# Patient Record
Sex: FEMALE | Race: WHITE | NOT HISPANIC OR LATINO | Employment: UNEMPLOYED | URBAN - METROPOLITAN AREA
[De-identification: names, ages, dates, MRNs, and addresses within clinical notes are randomized per-mention and may not be internally consistent; named-entity substitution may affect disease eponyms.]

---

## 2017-07-05 ENCOUNTER — HOSPITAL ENCOUNTER (OUTPATIENT)
Facility: HOSPITAL | Age: 57
Discharge: HOME | End: 2017-07-07
Attending: SURGERY | Admitting: FAMILY MEDICINE
Payer: COMMERCIAL

## 2017-07-05 DIAGNOSIS — R42 POSTURAL DIZZINESS WITH PRESYNCOPE: ICD-10-CM

## 2017-07-05 DIAGNOSIS — R42 DIZZINESS: ICD-10-CM

## 2017-07-05 DIAGNOSIS — H53.8 BLURRED VISION: Primary | ICD-10-CM

## 2017-07-05 DIAGNOSIS — K14.8: ICD-10-CM

## 2017-07-05 DIAGNOSIS — G45.9 TRANSIENT CEREBRAL ISCHEMIC ATTACK: ICD-10-CM

## 2017-07-05 DIAGNOSIS — R55 POSTURAL DIZZINESS WITH PRESYNCOPE: ICD-10-CM

## 2017-07-05 PROBLEM — F17.200 SMOKER: Status: ACTIVE | Noted: 2017-07-05

## 2017-07-05 PROBLEM — R20.0 NUMBNESS OF TONGUE: Status: ACTIVE | Noted: 2017-07-05

## 2017-07-05 LAB
ALBUMIN SERPL BCP-MCNC: 3.6 G/DL
ALP SERPL-CCNC: 119 U/L
ALT SERPL W/O P-5'-P-CCNC: 21 U/L
ANION GAP SERPL CALC-SCNC: 8 MMOL/L
AST SERPL-CCNC: 18 U/L
BACTERIA #/AREA URNS HPF: NORMAL /HPF
BASOPHILS # BLD AUTO: 0.03 K/UL
BASOPHILS NFR BLD: 0.4 %
BILIRUB SERPL-MCNC: 0.5 MG/DL
BILIRUB UR QL STRIP: NEGATIVE
BNP SERPL-MCNC: 31 PG/ML
BUN SERPL-MCNC: 17 MG/DL
CALCIUM SERPL-MCNC: 9.3 MG/DL
CHLORIDE SERPL-SCNC: 105 MMOL/L
CK MB SERPL-MCNC: 1.2 NG/ML
CK MB SERPL-RTO: 1.4 %
CK SERPL-CCNC: 83 U/L
CK SERPL-CCNC: 83 U/L
CLARITY UR: CLEAR
CO2 SERPL-SCNC: 27 MMOL/L
COLOR UR: YELLOW
CREAT SERPL-MCNC: 0.8 MG/DL
DIFFERENTIAL METHOD: NORMAL
EOSINOPHIL # BLD AUTO: 0.2 K/UL
EOSINOPHIL NFR BLD: 3 %
ERYTHROCYTE [DISTWIDTH] IN BLOOD BY AUTOMATED COUNT: 13.8 %
EST. GFR  (AFRICAN AMERICAN): >60 ML/MIN/1.73 M^2
EST. GFR  (NON AFRICAN AMERICAN): >60 ML/MIN/1.73 M^2
GLUCOSE SERPL-MCNC: 176 MG/DL
GLUCOSE UR QL STRIP: NEGATIVE
HCT VFR BLD AUTO: 44.6 %
HGB BLD-MCNC: 14.6 G/DL
HGB UR QL STRIP: ABNORMAL
INR PPP: 1
KETONES UR QL STRIP: NEGATIVE
LEUKOCYTE ESTERASE UR QL STRIP: NEGATIVE
LYMPHOCYTES # BLD AUTO: 2.7 K/UL
LYMPHOCYTES NFR BLD: 32.7 %
MAGNESIUM SERPL-MCNC: 1.8 MG/DL
MCH RBC QN AUTO: 29.5 PG
MCHC RBC AUTO-ENTMCNC: 32.7 %
MCV RBC AUTO: 90 FL
MICROSCOPIC COMMENT: NORMAL
MONOCYTES # BLD AUTO: 0.6 K/UL
MONOCYTES NFR BLD: 7.6 %
NEUTROPHILS # BLD AUTO: 4.6 K/UL
NEUTROPHILS NFR BLD: 56.3 %
NITRITE UR QL STRIP: NEGATIVE
PH UR STRIP: 5 [PH] (ref 5–8)
PHOSPHATE SERPL-MCNC: 3.3 MG/DL
PLATELET # BLD AUTO: 236 K/UL
PMV BLD AUTO: 10 FL
POTASSIUM SERPL-SCNC: 4.4 MMOL/L
PROT SERPL-MCNC: 6.9 G/DL
PROT UR QL STRIP: NEGATIVE
PROTHROMBIN TIME: 10.1 SEC
RBC # BLD AUTO: 4.95 M/UL
RBC #/AREA URNS HPF: 4 /HPF (ref 0–4)
SODIUM SERPL-SCNC: 140 MMOL/L
SP GR UR STRIP: 1.02 (ref 1–1.03)
TROPONIN I SERPL DL<=0.01 NG/ML-MCNC: <0.006 NG/ML
TROPONIN I SERPL DL<=0.01 NG/ML-MCNC: <0.006 NG/ML
TSH SERPL DL<=0.005 MIU/L-ACNC: 0.78 UIU/ML
URN SPEC COLLECT METH UR: ABNORMAL
UROBILINOGEN UR STRIP-ACNC: NEGATIVE EU/DL
WBC # BLD AUTO: 8.13 K/UL

## 2017-07-05 PROCEDURE — 83880 ASSAY OF NATRIURETIC PEPTIDE: CPT

## 2017-07-05 PROCEDURE — 25000003 PHARM REV CODE 250: Performed by: SURGERY

## 2017-07-05 PROCEDURE — 99285 EMERGENCY DEPT VISIT HI MDM: CPT

## 2017-07-05 PROCEDURE — 27000339 *HC DAILY SUPPLY KIT

## 2017-07-05 PROCEDURE — 96374 THER/PROPH/DIAG INJ IV PUSH: CPT

## 2017-07-05 PROCEDURE — 84443 ASSAY THYROID STIM HORMONE: CPT

## 2017-07-05 PROCEDURE — 25000003 PHARM REV CODE 250: Performed by: FAMILY MEDICINE

## 2017-07-05 PROCEDURE — 93005 ELECTROCARDIOGRAM TRACING: CPT

## 2017-07-05 PROCEDURE — 83735 ASSAY OF MAGNESIUM: CPT

## 2017-07-05 PROCEDURE — 36415 COLL VENOUS BLD VENIPUNCTURE: CPT

## 2017-07-05 PROCEDURE — 80053 COMPREHEN METABOLIC PANEL: CPT

## 2017-07-05 PROCEDURE — 83036 HEMOGLOBIN GLYCOSYLATED A1C: CPT

## 2017-07-05 PROCEDURE — G0378 HOSPITAL OBSERVATION PER HR: HCPCS

## 2017-07-05 PROCEDURE — 85025 COMPLETE CBC W/AUTO DIFF WBC: CPT

## 2017-07-05 PROCEDURE — 81000 URINALYSIS NONAUTO W/SCOPE: CPT

## 2017-07-05 PROCEDURE — 63600175 PHARM REV CODE 636 W HCPCS: Performed by: FAMILY MEDICINE

## 2017-07-05 PROCEDURE — 99220 PR INITIAL OBSERVATION CARE,LEVL III: CPT | Mod: ,,, | Performed by: FAMILY MEDICINE

## 2017-07-05 PROCEDURE — 82553 CREATINE MB FRACTION: CPT

## 2017-07-05 PROCEDURE — A9585 GADOBUTROL INJECTION: HCPCS | Performed by: FAMILY MEDICINE

## 2017-07-05 PROCEDURE — 94761 N-INVAS EAR/PLS OXIMETRY MLT: CPT

## 2017-07-05 PROCEDURE — 84100 ASSAY OF PHOSPHORUS: CPT

## 2017-07-05 PROCEDURE — 25500020 PHARM REV CODE 255: Performed by: FAMILY MEDICINE

## 2017-07-05 PROCEDURE — 85610 PROTHROMBIN TIME: CPT

## 2017-07-05 PROCEDURE — 84484 ASSAY OF TROPONIN QUANT: CPT

## 2017-07-05 PROCEDURE — 84484 ASSAY OF TROPONIN QUANT: CPT | Mod: 91

## 2017-07-05 RX ORDER — ASPIRIN 81 MG/1
81 TABLET ORAL DAILY
Status: DISCONTINUED | OUTPATIENT
Start: 2017-07-05 | End: 2017-07-07 | Stop reason: HOSPADM

## 2017-07-05 RX ORDER — HYDROCODONE BITARTRATE AND ACETAMINOPHEN 5; 325 MG/1; MG/1
1 TABLET ORAL EVERY 4 HOURS PRN
Status: DISCONTINUED | OUTPATIENT
Start: 2017-07-05 | End: 2017-07-07 | Stop reason: HOSPADM

## 2017-07-05 RX ORDER — ONDANSETRON 2 MG/ML
4 INJECTION INTRAMUSCULAR; INTRAVENOUS EVERY 8 HOURS PRN
Status: DISCONTINUED | OUTPATIENT
Start: 2017-07-05 | End: 2017-07-07 | Stop reason: HOSPADM

## 2017-07-05 RX ORDER — PANTOPRAZOLE SODIUM 40 MG/1
40 TABLET, DELAYED RELEASE ORAL DAILY
Status: DISCONTINUED | OUTPATIENT
Start: 2017-07-05 | End: 2017-07-07 | Stop reason: HOSPADM

## 2017-07-05 RX ORDER — LORAZEPAM 2 MG/ML
1 INJECTION INTRAMUSCULAR ONCE
Status: COMPLETED | OUTPATIENT
Start: 2017-07-05 | End: 2017-07-05

## 2017-07-05 RX ORDER — IBUPROFEN 200 MG
1 TABLET ORAL DAILY
Status: DISCONTINUED | OUTPATIENT
Start: 2017-07-05 | End: 2017-07-07 | Stop reason: HOSPADM

## 2017-07-05 RX ORDER — ACETAMINOPHEN 325 MG/1
650 TABLET ORAL EVERY 8 HOURS PRN
Status: DISCONTINUED | OUTPATIENT
Start: 2017-07-05 | End: 2017-07-07 | Stop reason: HOSPADM

## 2017-07-05 RX ORDER — GADOBUTROL 604.72 MG/ML
6 INJECTION INTRAVENOUS
Status: COMPLETED | OUTPATIENT
Start: 2017-07-05 | End: 2017-07-05

## 2017-07-05 RX ADMIN — LORAZEPAM 1 MG: 2 INJECTION, SOLUTION INTRAMUSCULAR; INTRAVENOUS at 03:07

## 2017-07-05 RX ADMIN — PANTOPRAZOLE SODIUM 40 MG: 40 TABLET, DELAYED RELEASE ORAL at 02:07

## 2017-07-05 RX ADMIN — NICOTINE 1 PATCH: 21 PATCH, EXTENDED RELEASE TRANSDERMAL at 07:07

## 2017-07-05 RX ADMIN — ASPIRIN 81 MG: 81 TABLET, COATED ORAL at 12:07

## 2017-07-05 RX ADMIN — GADOBUTROL 6 ML: 604.72 INJECTION INTRAVENOUS at 03:07

## 2017-07-05 NOTE — PROGRESS NOTES
"Nursing Notes  Bedside report received. Pt stable, and to room 303. Pt transferred from wheelchair to bed independently. Pt complains of "tip of tongue numbness." Pt is AAO x4. No acute distress noted. Plan of care reviewed with pt, and pt states agreement.     Huddle Comments    "

## 2017-07-05 NOTE — PROGRESS NOTES
Went down to MRI. Ativan given. I stayed for a few minutes to make sure pt is ok. Pt seems to be doing good so far.

## 2017-07-05 NOTE — PROGRESS NOTES
MRI tech called and states pt is refusing the MRI test because she is Claustrophobic. MD called and states to give 1mg Ativan IVP for one dose. Will intervene.

## 2017-07-05 NOTE — ED TRIAGE NOTES
"Patient was in her air conditioned car, got out to help her mother and became dizzy and felt like her tongue was getting "thick".  States she is still dizzy.  No  nystagmus noted.   "

## 2017-07-05 NOTE — ED PROVIDER NOTES
"[facility]                                        July 5, 2017                   Chief Complaint  56 y.o. female with Dizziness (dizzy and felt like her tongue was getthing "thick")    History of Present Illness  Nikky Mo presents to the emergency room with dizziness and blurred vision today  She had acute dizziness and blurred vision at 10:30 this morning, 45 minutes PTA  Additionally, the patient states that her typical her tongue felt heavy, no dysarthria  Patient states all these symptoms are new, she's never had dizziness or blurred vision  Patient presents with no gross motor defect, no facial droop, normal motor strength  Patient has no stroke history, has no history of cardiovascular disease per interview    patient however has a poor diet and is in every day heavy smoker for several years    The history is provided by the patient  Medical history: Ganglion cyst of the knee and kidney stones  Surgical history: Hysterectomy  No known ALLERGIES  Social history: , smoker, not employed  No pertinent family history    Review of Systems and Physical Exam     Review of Systems  -- Constitution - no fever, denies fatigue, no weakness, no chills  -- Eyes - no tearing or redness, no visual disturbance  -- Ear, Nose - no tinnitus or earache, no nasal congestion or discharge  -- Mouth,Throat - no sore throat, no toothache, normal voice, normal swallowing  -- Respiratory - denies cough and congestion, no shortness of breath, no CASTRO  -- Cardiovascular - denies chest pain, no palpitations, denies claudication  -- Gastrointestinal - denies abdominal pain, nausea, vomiting, or diarrhea  -- Genitourinary - no dysuria, no denies flank pain, no hematuria or frequency   -- Musculoskeletal - denies back pain, negative for myalgias and arthralgias   -- Neurological - blurred vision, tongue heaviness, dizziness ×45 minutes  -- Skin - denies pallor, rash, or changes in skin. no hives or welts " noted    Vital Signs  -- Her blood pressure is 140/93 (abnormal) and her pulse is 78.      Physical Exam  -- Nursing note and vitals reviewed  -- Constitutional: Appears well-developed and well-nourished  -- Head: Atraumatic. Normocephalic. No obvious abnormality  -- Eyes: Pupils are equal and reactive to light. Normal conjunctiva and lids  -- Cardiac: Normal rate, regular rhythm and normal heart sounds  -- Pulmonary: Normal respiratory effort, breath sounds clear to auscultation  -- Abdominal: Soft, no tenderness. Normal bowel sounds. Normal liver edge  -- Musculoskeletal: Normal range of motion, no effusions. Joints stable   -- Neurological: No focal deficits. Showed good interaction with staff  -- Vascular: Posterior tibial, dorsalis pedis and radial pulses 2+ bilaterally      Emergency Room Course     Treatment and Evaluation  -- The CT of the head performed in the ER today was negative for acute pathology   -- The EKG findings today were without concerning findings from baseline   -- The electrolytes drawn in the ER today were within normal limits   -- The CBC drawn in the ER today was within normal limits   -- The magnesium and phosphorus were within normal limits   -- TSH drawn in the ER today was negative    -- The troponin drawn in the ER today was within normal limits   -- The BNP drawn in the ER today were within normal limits   -- The PT, PTT, and INR were within normal limits.    -- PO 81 MG ASA given in today in the ER    ED Physician Notes  -- 2:04 PM: The patient's symptoms have not completely resolved today in the ER  -- CT head negative, still has tingling of the tongue, blurred vision has much improved  -- Will admit the patient for evaluation for TIA versus CVA: Discussed with Dr. Almanza    Diagnosis  -- Blurred vision  -- Dizziness  -- Tongue spasticity     Disposition and Plan  -- Disposition: observation  -- Condition: stable  -- Telemetry monitoring  -- Morning labs  -- SCD hoses  -- Home  medications  -- Nausea medication when necessary  -- Pain medication when necessary  -- Hep-Lock IV  -- Routine monitoring  -- Bed rest until otherwise stated  -- Low salt diet  -- Protonix for GERD prophylaxis  -- EKG in the morning  -- Aspirin daily  -- Neurology consult  -- Neuro checks  -- US Carotid  -- MRI Brain  -- 2D echo    This note is dictated on Dragon Natural Speaking word recognition program.  There are word recognition mistakes that are occasionally missed on review.           Shiva Gaines MD  07/05/17 2242

## 2017-07-05 NOTE — PLAN OF CARE
Problem: Patient Care Overview  Goal: Plan of Care Review  Outcome: Ongoing (interventions implemented as appropriate)  Vitals stable, afebrile, no signs of distress noted. Pt denies any pain or SOB. Pt states she still has slight numbness/tingling on the tip of her tongue, but denies any blurred vision or numbness/tingling in extremities. Cardiac monitoring maintained. SCD's maintained. Fall precautions,call bell in reach and pt instructed to call for assistance. Pt agrees with plan of care. No questions at this time.

## 2017-07-06 LAB
AORTIC VALVE REGURGITATION: NORMAL
CHOLEST/HDLC SERPL: 5.1 {RATIO}
DIASTOLIC DYSFUNCTION: NO
HDL/CHOLESTEROL RATIO: 19.4 %
HDLC SERPL-MCNC: 211 MG/DL
HDLC SERPL-MCNC: 41 MG/DL
LDLC SERPL CALC-MCNC: 141.4 MG/DL
MITRAL VALVE MOBILITY: NORMAL
NONHDLC SERPL-MCNC: 170 MG/DL
RETIRED EF AND QEF - SEE NOTES: 65 (ref 55–65)
TRICUSPID VALVE REGURGITATION: NORMAL
TRIGL SERPL-MCNC: 143 MG/DL
TROPONIN I SERPL DL<=0.01 NG/ML-MCNC: <0.006 NG/ML

## 2017-07-06 PROCEDURE — 36415 COLL VENOUS BLD VENIPUNCTURE: CPT

## 2017-07-06 PROCEDURE — 80061 LIPID PANEL: CPT

## 2017-07-06 PROCEDURE — 93306 TTE W/DOPPLER COMPLETE: CPT | Mod: 26,,, | Performed by: INTERNAL MEDICINE

## 2017-07-06 PROCEDURE — 27000339 *HC DAILY SUPPLY KIT

## 2017-07-06 PROCEDURE — 25000003 PHARM REV CODE 250: Performed by: SURGERY

## 2017-07-06 PROCEDURE — 25000003 PHARM REV CODE 250: Performed by: NURSE PRACTITIONER

## 2017-07-06 PROCEDURE — 99225 PR SUBSEQUENT OBSERVATION CARE,LEVEL II: CPT | Mod: ,,, | Performed by: FAMILY MEDICINE

## 2017-07-06 PROCEDURE — 96374 THER/PROPH/DIAG INJ IV PUSH: CPT

## 2017-07-06 PROCEDURE — 25000003 PHARM REV CODE 250: Performed by: FAMILY MEDICINE

## 2017-07-06 PROCEDURE — 94761 N-INVAS EAR/PLS OXIMETRY MLT: CPT

## 2017-07-06 PROCEDURE — 84484 ASSAY OF TROPONIN QUANT: CPT | Mod: 91

## 2017-07-06 PROCEDURE — G0378 HOSPITAL OBSERVATION PER HR: HCPCS

## 2017-07-06 PROCEDURE — 27200120 HC KIT IV START (RUSH ONLY)

## 2017-07-06 PROCEDURE — 99205 OFFICE O/P NEW HI 60 MIN: CPT | Mod: ,,, | Performed by: PSYCHIATRY & NEUROLOGY

## 2017-07-06 RX ORDER — ATORVASTATIN CALCIUM 20 MG/1
20 TABLET, FILM COATED ORAL DAILY
Status: DISCONTINUED | OUTPATIENT
Start: 2017-07-06 | End: 2017-07-07 | Stop reason: HOSPADM

## 2017-07-06 RX ADMIN — PANTOPRAZOLE SODIUM 40 MG: 40 TABLET, DELAYED RELEASE ORAL at 08:07

## 2017-07-06 RX ADMIN — NICOTINE 1 PATCH: 21 PATCH, EXTENDED RELEASE TRANSDERMAL at 08:07

## 2017-07-06 RX ADMIN — HYDROCODONE BITARTRATE AND ACETAMINOPHEN 1 TABLET: 5; 325 TABLET ORAL at 10:07

## 2017-07-06 RX ADMIN — ATORVASTATIN CALCIUM 20 MG: 20 TABLET, FILM COATED ORAL at 03:07

## 2017-07-06 RX ADMIN — ASPIRIN 81 MG: 81 TABLET, COATED ORAL at 08:07

## 2017-07-06 NOTE — PLAN OF CARE
Problem: Patient Care Overview  Goal: Plan of Care Review  Outcome: Ongoing (interventions implemented as appropriate)  Plan of care reviewed with patient and she agrees with the plan of care. Neuro checks WNL. Patient no longer with c/o numbness to tongue. VSS. SCDs intact. No distress noted.     Problem: Fall Risk (Adult)  Goal: Absence of Falls  Patient will demonstrate the desired outcomes by discharge/transition of care.   Outcome: Ongoing (interventions implemented as appropriate)  Fall precautions maintained. Bed alarm engaged at all times. Daughter at bedside. Patient understands to call for assistance prior to getting out of bed.

## 2017-07-06 NOTE — ASSESSMENT & PLAN NOTE
Concerning for arrhythmia causing transient decreased cerebral perfusion or TIA  EKG with NSR with PACs...   Will perform full neuro work up and monitor on tele

## 2017-07-06 NOTE — CONSULTS
"[facility]  Neurology  Consult Note    Patient Name: Nikky Mo  MRN: [id number]  Admission Date: 7/5/2017  Hospital Length of Stay: 0 days  Code Status: Full Code   Attending Provider: Yovany Almanza MD      Consulting Provider: Victoriano Noyola MD  Primary Care Physician: Ambreen Gonzalez NP  Principal Problem:Postural dizziness with presyncope    Inpatient consult to Neurology  Consult performed by: VICTORIANO NOYOLA  Consult ordered by: BELA VACA         Subjective:     Chief Complaint:  Episode of numb tongue and dizziness    HPI:   Nikky Mo is a 56 y.o. female smoker who presents with episode of feeling light headed yesterday with tongue tip numbness.    The patient patient was last seen normal at 10:30 am. She then got up to walk into a building but felt the world was closing in around her and her tongue felt numb and heavy. She recalls the entire event and has persistent symptoms of light headedness without vertigo for 20 minutes but the numb tongue did not fully resolve until last pm- it had improved but felt "funny"  She is back to baseline today.   She has been admitted, MRI and carotid studies have been done. Cardiology is consulted. Note her lower HR. She does not take daily ASA. She does not seek routine health care.    States numbness was on the tip of her tongue bilaterally.   Past Medical History:   Diagnosis Date    Encounter for blood transfusion     Ganglion of knee     H/O: hysterectomy     H/O: hysterectomy     Renal calculi        Past Surgical History:   Procedure Laterality Date    HYSTERECTOMY         Review of patient's allergies indicates:  No Known Allergies    I have reviewed all of this patient's past medical and surgical histories as well as family and social histories and active allergies and medications as documented in the electronic medical record.      No current facility-administered medications on file prior to encounter. "      No current outpatient prescriptions on file prior to encounter.     Family History     None        Social History Main Topics    Smoking status: Current Every Day Smoker     Packs/day: 1.00     Types: Cigarettes     Start date: 7/5/1978    Smokeless tobacco: Never Used    Alcohol use Yes      Comment: socially    Drug use: No    Sexual activity: No     Review of Systems   Constitutional: Negative for fever.   HENT: Negative for nosebleeds.    Eyes: Negative for photophobia.   Respiratory: Negative for shortness of breath.    Cardiovascular: Negative for chest pain.   Gastrointestinal: Negative for blood in stool.   Genitourinary: Negative for hematuria.   Musculoskeletal: Negative for gait problem.   Neurological: Negative for syncope.   Psychiatric/Behavioral: Negative for hallucinations.     Objective:     Vital Signs (Most Recent):  Temp: 96.8 °F (36 °C) (07/06/17 1200)  Pulse: 68 (07/06/17 1200)  Resp: 18 (07/06/17 1200)  BP: 124/68 (07/06/17 1200)  SpO2: 97 % (07/06/17 1200) Vital Signs (24h Range):  Temp:  [96 °F (35.6 °C)-97 °F (36.1 °C)] 96.8 °F (36 °C)  Pulse:  [52-72] 68  Resp:  [16-18] 18  SpO2:  [95 %-98 %] 97 %  BP: (108-147)/(50-70) 124/68     Weight: 64.1 kg (141 lb 5 oz)  Body mass index is 27.6 kg/m².    Physical Exam         Gen Appearance, well developed/nourished in no apparent distress  CV: 2+ distal pulses with no edema or swelling  Neuro:  MS: Awake, alert, oriented to place, person, time, situation. Sustains attention. Recent/remote memory intact, Language is full to spontaneous speech/repetition/naming/comprehension. Fund of Knowledge is full  CN: Optic discs are flat with normal vasculature, PERRL, Extraoccular movements and visual fields are full. Normal facial sensation and strength, Hearing symmetric, Tongue and Palate are midline and strong. Shoulder Shrug symmetric and strong.  Motor: Normal bulk, tone, no abnormal movements. 5/5 strength bilateral upper/lower extremities  with 2+ reflexes and bilateral plantar response  Sensory: symmetric to light touch, pain, temp, and vibration. Romberg negative  Cerebellar: Finger-nose,Heal-shin, Rapid alternating movements intact  Gait: Normal stance, no ataxia    Significant Labs:  noted    Significant Imaging: Carotid US: less than 50% stenosis  MRI brain: no acute CVA.   MRA brain: normal study    Assessment and Plan:   Nikky Mo is a 56 y.o. female with presyncopal symptoms and distal  tongue numbness yesterday.   I recommend:    Numbness of tongue    -MRI and  MRA brain and carotids are reassuring, but spell could have been TIA +/- cardiac in nature  -Follow up 2D echo with bubble study  -Continue Telemetry and neurochecks  -She is not a TPA candidate as this could be a stroke mimic and all symptoms are resolved  -Check A1C count  -Agree with Asprin started this admission  -Would recommend Statin therapy for goal LDL less than 100  -Ok to treat BP as needed  -Smoker urged to quit to CVA prevention  -Signs and symptoms of stroke on d/c summary and follow up with neurology per d/c summary recommended.         * Postural dizziness with presyncope    -Cardiology evaluation ongoing          Thanks for this consult.     Moise Noyola MD  Neurology  [facility]

## 2017-07-06 NOTE — SUBJECTIVE & OBJECTIVE
Interval History: see     Review of Systems   Constitutional: Negative for chills, diaphoresis, fatigue and fever.   HENT: Negative for nosebleeds, postnasal drip, rhinorrhea, sinus pressure, sneezing and sore throat.    Eyes: Positive for visual disturbance.   Respiratory: Negative for cough, shortness of breath and wheezing.    Cardiovascular: Negative.  Negative for chest pain, palpitations and leg swelling.   Gastrointestinal: Positive for anal bleeding. Negative for abdominal distention, abdominal pain, blood in stool, constipation, diarrhea, nausea, rectal pain and vomiting.   Musculoskeletal: Negative for arthralgias, back pain and myalgias.   Neurological: Positive for dizziness, speech difficulty, light-headedness, numbness and headaches. Negative for tremors, seizures, syncope, facial asymmetry and weakness.   Hematological: Negative for adenopathy. Does not bruise/bleed easily.   Psychiatric/Behavioral: Negative for agitation and sleep disturbance. The patient is not nervous/anxious.      Objective:     Vital Signs (Most Recent):  Temp: 96 °F (35.6 °C) (07/06/17 0745)  Pulse: 67 (07/06/17 0745)  Resp: 16 (07/06/17 0745)  BP: (!) 121/50 (07/06/17 0745)  SpO2: 97 % (07/06/17 0745) Vital Signs (24h Range):  Temp:  [96 °F (35.6 °C)-97 °F (36.1 °C)] 96 °F (35.6 °C)  Pulse:  [52-78] 67  Resp:  [16-18] 16  SpO2:  [95 %-98 %] 97 %  BP: (108-147)/(50-93) 121/50     Weight: 64.1 kg (141 lb 5 oz)  Body mass index is 27.6 kg/m².    Intake/Output Summary (Last 24 hours) at 07/06/17 0800  Last data filed at 07/05/17 1732   Gross per 24 hour   Intake              300 ml   Output              200 ml   Net              100 ml      Physical Exam   Constitutional: She is oriented to person, place, and time. She appears well-developed and well-nourished.   Weathered female with masculinization of voice due to tobacco use    HENT:   Head: Normocephalic and atraumatic.   Right Ear: External ear normal.   Left Ear: External  ear normal.   Nose: Nose normal.   Mouth/Throat: Oropharynx is clear and moist.   Eyes: EOM are normal. Pupils are equal, round, and reactive to light.   Neck: Normal range of motion. Neck supple. No JVD present. No tracheal deviation present. No thyromegaly present.   Cardiovascular: Normal rate, normal heart sounds and intact distal pulses.    No murmur heard.  Irregular rhythm with varying amplitude of apical pulse    Pulmonary/Chest: Effort normal and breath sounds normal. No respiratory distress. She has no wheezes. She has no rales. She exhibits no tenderness.   Decreased tidal volume    Abdominal: Soft. Bowel sounds are normal. She exhibits no distension and no mass. There is no tenderness. There is no rebound and no guarding.   Musculoskeletal: Normal range of motion. She exhibits no edema or tenderness.   Lymphadenopathy:     She has no cervical adenopathy.   Neurological: She is alert and oriented to person, place, and time. She has normal reflexes. She displays normal reflexes. No cranial nerve deficit. She exhibits normal muscle tone. Coordination normal.   Skin: Skin is warm and dry. No rash noted. No erythema. No pallor.   Psychiatric: She has a normal mood and affect. Her behavior is normal. Judgment and thought content normal.       Significant Labs:   CBC:   Recent Labs  Lab 07/05/17  1121   WBC 8.13   HGB 14.6   HCT 44.6        CMP:   Recent Labs  Lab 07/05/17  1122      K 4.4      CO2 27   *   BUN 17   CREATININE 0.8   CALCIUM 9.3   PROT 6.9   ALBUMIN 3.6   BILITOT 0.5   ALKPHOS 119   AST 18   ALT 21   ANIONGAP 8   EGFRNONAA >60     Troponin:   Recent Labs  Lab 07/05/17  1122 07/05/17 2006 07/06/17  0356   TROPONINI <0.006 <0.006 <0.006     TSH:   Recent Labs  Lab 07/05/17  1122   TSH 0.783       Significant Imaging: CT: I have reviewed all pertinent results/findings within the past 24 hours and my personal findings are:  wnl  CXR: I have reviewed all pertinent  results/findings within the past 24 hours and my personal findings are:  wnl  U/S: I have reviewed all pertinent results/findings within the past 24 hours and my personal findings are:  wnl  wnl

## 2017-07-06 NOTE — H&P
"[facility]  History & Physical    Patient Name: Nikky Mo  MRN: [id number]  Admission Date: 7/5/2017  Attending Physician: Yovany Almanza MD   Primary Care Provider: Ambreen Gonzalez NP         Patient information was obtained from patient and ER records.     Subjective:     Principal Problem:Postural dizziness with presyncope    Chief Complaint:   Chief Complaint   Patient presents with    Dizziness     was sitting, got up and became dizzy and felt like her tongue was getthing "thick".  states she is still dizzy        HPI: 55 y/o female smoker with very sparse, spotty medical care (loose relationship with Krys Gonzalez as her PCP) presents to ED with presyncopal episode associated with blurred/diminished vision and tip of tongue numbness. She was sitting in an air conditioned vehicle this am waiting for her mother at the gym. When she stepped out of the vehicle to receive her, she immediately became dizzy, lightheaded and weak all over. Was able to manage her mother into the car, but driving proved to be too difficult. She called her sister who came to get her and her mother. Around this time she felt her vision going "in and out" and she was having trouble articulating words b/c her tongue felt "fat." Work up in ED is essentially negative. She is admitted for tele obs to rule out arrhythmia and neuro work up to rule out TIA/CVA. Currently she is having no focal deficits at time of admission     Past Medical History:   Diagnosis Date    Encounter for blood transfusion     Ganglion of knee     H/O: hysterectomy     H/O: hysterectomy     Renal calculi        Past Surgical History:   Procedure Laterality Date    HYSTERECTOMY         Review of patient's allergies indicates:  No Known Allergies    No current facility-administered medications on file prior to encounter.      No current outpatient prescriptions on file prior to encounter.     Family History     None    "     Social History Main Topics    Smoking status: Current Every Day Smoker     Packs/day: 1.00     Types: Cigarettes     Start date: 7/5/1978    Smokeless tobacco: Never Used    Alcohol use Yes      Comment: socially    Drug use: No    Sexual activity: No     Review of Systems   Constitutional: Negative.  Negative for activity change, appetite change, chills, diaphoresis, fatigue, fever and unexpected weight change.   HENT: Negative for congestion, dental problem, drooling, ear discharge, ear pain, facial swelling, hearing loss, mouth sores, nosebleeds, postnasal drip, rhinorrhea, sinus pressure, sneezing, sore throat, tinnitus, trouble swallowing and voice change.    Eyes: Positive for visual disturbance. Negative for photophobia, pain, discharge, redness and itching.   Respiratory: Negative for apnea, cough, choking, chest tightness, shortness of breath and wheezing.    Cardiovascular: Negative.  Negative for chest pain, palpitations and leg swelling.   Gastrointestinal: Negative.  Negative for abdominal distention, abdominal pain, anal bleeding, blood in stool, constipation, diarrhea, nausea, rectal pain and vomiting.   Genitourinary: Negative.  Negative for difficulty urinating, dyspareunia, dysuria, enuresis, flank pain, frequency, hematuria, menstrual problem, pelvic pain, urgency, vaginal bleeding, vaginal discharge and vaginal pain.   Musculoskeletal: Negative.  Negative for arthralgias, back pain, gait problem, joint swelling, myalgias, neck pain and neck stiffness.   Skin: Negative.  Negative for color change, pallor, rash and wound.   Neurological: Positive for dizziness, speech difficulty, light-headedness, numbness and headaches. Negative for tremors, seizures, syncope, facial asymmetry and weakness.   Hematological: Negative for adenopathy. Does not bruise/bleed easily.   Psychiatric/Behavioral: Negative.  Negative for agitation, behavioral problems, confusion, decreased concentration, dysphoric  mood, hallucinations, self-injury, sleep disturbance and suicidal ideas. The patient is not nervous/anxious and is not hyperactive.      Objective:     Vital Signs (Most Recent):  Temp: 97 °F (36.1 °C) (07/05/17 1407)  Pulse: (!) 52 (07/05/17 1800)  Resp: 16 (07/05/17 1407)  BP: (!) 146/60 (07/05/17 1407)  SpO2: 98 % (07/05/17 1405) Vital Signs (24h Range):  Temp:  [96.8 °F (36 °C)-97 °F (36.1 °C)] 97 °F (36.1 °C)  Pulse:  [52-78] 52  Resp:  [16] 16  SpO2:  [98 %] 98 %  BP: (140-146)/(60-93) 146/60     Weight: 64.1 kg (141 lb 5 oz)  Body mass index is 27.6 kg/m².    Physical Exam   Constitutional: She is oriented to person, place, and time. She appears well-developed and well-nourished.   Weathered female with masculinization of voice due to tobacco use    HENT:   Head: Normocephalic and atraumatic.   Right Ear: External ear normal.   Left Ear: External ear normal.   Nose: Nose normal.   Mouth/Throat: Oropharynx is clear and moist.   Eyes: EOM are normal. Pupils are equal, round, and reactive to light.   Neck: Normal range of motion. Neck supple. No JVD present. No tracheal deviation present. No thyromegaly present.   Cardiovascular: Normal rate, normal heart sounds and intact distal pulses.    No murmur heard.  Irregular rhythm with varying amplitude of apical pulse    Pulmonary/Chest: Effort normal and breath sounds normal. No respiratory distress. She has no wheezes. She has no rales. She exhibits no tenderness.   Decreased tidal volume    Abdominal: Soft. Bowel sounds are normal. She exhibits no distension and no mass. There is no tenderness. There is no rebound and no guarding.   Musculoskeletal: Normal range of motion. She exhibits no edema or tenderness.   Lymphadenopathy:     She has no cervical adenopathy.   Neurological: She is alert and oriented to person, place, and time. She has normal reflexes. She displays normal reflexes. No cranial nerve deficit. She exhibits normal muscle tone. Coordination normal.    Skin: Skin is warm and dry. No rash noted. No erythema. No pallor.   Psychiatric: She has a normal mood and affect. Her behavior is normal. Judgment and thought content normal.        Significant Labs:   CBC:   Recent Labs  Lab 07/05/17  1121   WBC 8.13   HGB 14.6   HCT 44.6        CMP:   Recent Labs  Lab 07/05/17  1122      K 4.4      CO2 27   *   BUN 17   CREATININE 0.8   CALCIUM 9.3   PROT 6.9   ALBUMIN 3.6   BILITOT 0.5   ALKPHOS 119   AST 18   ALT 21   ANIONGAP 8   EGFRNONAA >60       Significant Imaging: I have reviewed and interpreted all pertinent imaging results/findings within the past 24 hours.    Assessment/Plan:     * Postural dizziness with presyncope    Check orthostatics, serial enzymes   Monitor on Tele for arrhythmia, get ECHO           Smoker    [place] in smoking cessation clinic at discharge.  Nicotine patch now         Numbness of tongue    See blurred vision plan          Blurred vision    Concerning for arrhythmia causing transient decreased cerebral perfusion or TIA  EKG with NSR with PACs...   Will perform full neuro work up and monitor on tele             VTE Risk Mitigation         Ordered     Medium Risk of VTE  Once      07/05/17 1404     Place sequential compression device  Until discontinued      07/05/17 1404        Yovany Almanza MD  Department of Hospital Medicine   [facility]

## 2017-07-06 NOTE — SUBJECTIVE & OBJECTIVE
Past Medical History:   Diagnosis Date    Encounter for blood transfusion     Ganglion of knee     H/O: hysterectomy     H/O: hysterectomy     Renal calculi        Past Surgical History:   Procedure Laterality Date    HYSTERECTOMY         Review of patient's allergies indicates:  No Known Allergies    I have reviewed all of this patient's past medical and surgical histories as well as family and social histories and active allergies and medications as documented in the electronic medical record.      No current facility-administered medications on file prior to encounter.      No current outpatient prescriptions on file prior to encounter.     Family History     None        Social History Main Topics    Smoking status: Current Every Day Smoker     Packs/day: 1.00     Types: Cigarettes     Start date: 7/5/1978    Smokeless tobacco: Never Used    Alcohol use Yes      Comment: socially    Drug use: No    Sexual activity: No     Review of Systems   Constitutional: Negative for fever.   HENT: Negative for nosebleeds.    Eyes: Negative for photophobia.   Respiratory: Negative for shortness of breath.    Cardiovascular: Negative for chest pain.   Gastrointestinal: Negative for blood in stool.   Genitourinary: Negative for hematuria.   Musculoskeletal: Negative for gait problem.   Neurological: Negative for syncope.   Psychiatric/Behavioral: Negative for hallucinations.     Objective:     Vital Signs (Most Recent):  Temp: 96.8 °F (36 °C) (07/06/17 1200)  Pulse: 68 (07/06/17 1200)  Resp: 18 (07/06/17 1200)  BP: 124/68 (07/06/17 1200)  SpO2: 97 % (07/06/17 1200) Vital Signs (24h Range):  Temp:  [96 °F (35.6 °C)-97 °F (36.1 °C)] 96.8 °F (36 °C)  Pulse:  [52-72] 68  Resp:  [16-18] 18  SpO2:  [95 %-98 %] 97 %  BP: (108-147)/(50-70) 124/68     Weight: 64.1 kg (141 lb 5 oz)  Body mass index is 27.6 kg/m².    Physical Exam         Gen Appearance, well developed/nourished in no apparent distress  CV: 2+ distal pulses with  no edema or swelling  Neuro:  MS: Awake, alert, oriented to place, person, time, situation. Sustains attention. Recent/remote memory intact, Language is full to spontaneous speech/repetition/naming/comprehension. Fund of Knowledge is full  CN: Optic discs are flat with normal vasculature, PERRL, Extraoccular movements and visual fields are full. Normal facial sensation and strength, Hearing symmetric, Tongue and Palate are midline and strong. Shoulder Shrug symmetric and strong.  Motor: Normal bulk, tone, no abnormal movements. 5/5 strength bilateral upper/lower extremities with 2+ reflexes and bilateral plantar response  Sensory: symmetric to light touch, pain, temp, and vibration. Romberg negative  Cerebellar: Finger-nose,Heal-shin, Rapid alternating movements intact  Gait: Normal stance, no ataxia    Significant Labs:  noted    Significant Imaging: Carotid US: less than 50% stenosis  MRI brain: no acute CVA.   MRA brain: normal study

## 2017-07-06 NOTE — HOSPITAL COURSE
W/up including CT head as well as MRI/MRA is negative. No arrhythmias noted. US of carotids with < 50% stenosis. Echo read to be normal as well. Pt not with symptoms this am. Reports ready to go home.

## 2017-07-06 NOTE — PROGRESS NOTES
Nursing Notes  Bedside report received. Pt denies any needs at this time, and no acute distress noted.     Huddle Comments

## 2017-07-06 NOTE — PLAN OF CARE
Problem: Patient Care Overview  Goal: Plan of Care Review  Outcome: Ongoing (interventions implemented as appropriate)  Vitals stable, afebrile, no signs of distress noted. Pt denies any pain or SOB. Pt states she still has no numbness/tingling or any blurred vision or numbness/tingling in extremities. Cardiac monitoring maintained. SCD's maintained. Neurology and CIS on case. 2D echo done today, see reslults. Fall precautions,call bell in reach and pt instructed to call for assistance. Pt agrees with plan of care. No questions at this time.

## 2017-07-06 NOTE — PROGRESS NOTES
"[facility] Hospital Medicine  Progress Note    Patient Name: Nikky Mo  MRN: [id number]  Patient Class: OP- Observation   Admission Date: 7/5/2017  Length of Stay: 0 days  Attending Physician: Yovany Almanza MD  Primary Care Provider: Ambreen Gonzalez NP        Subjective:     Principal Problem:Postural dizziness with presyncope    HPI:  55 y/o female smoker with very sparse, spotty medical care (loose relationship with Krys Gonzalez as her PCP) presents to ED with presyncopal episode associated with blurred/diminished vision and tip of tongue numbness. She was sitting in an air conditioned vehicle this am waiting for her mother at the gym. When she stepped out of the vehicle to receive her, she immediately became dizzy, lightheaded and weak all over. Was able to manage her mother into the car, but driving proved to be too difficult. She called her sister who came to get her and her mother. Around this time she felt her vision going "in and out" and she was having trouble articulating words b/c her tongue felt "fat." Work up in ED is essentially negative. She is admitted for tele obs to rule out arrhythmia and neuro work up to rule out TIA/CVA. Currently she is having no focal deficits at time of admission     Hospital Course:  W/up including CT head as well as MRI/MRA is negative. No arrhythmias noted. US of carotids with < 50% stenosis.     Interval History: see     Review of Systems   Constitutional: Negative for chills, diaphoresis, fatigue and fever.   HENT: Negative for nosebleeds, postnasal drip, rhinorrhea, sinus pressure, sneezing and sore throat.    Eyes: Positive for visual disturbance.   Respiratory: Negative for cough, shortness of breath and wheezing.    Cardiovascular: Negative.  Negative for chest pain, palpitations and leg swelling.   Gastrointestinal: Positive for anal bleeding. Negative for abdominal distention, abdominal pain, blood in stool, constipation, diarrhea, " nausea, rectal pain and vomiting.   Musculoskeletal: Negative for arthralgias, back pain and myalgias.   Neurological: Positive for dizziness, speech difficulty, light-headedness, numbness and headaches. Negative for tremors, seizures, syncope, facial asymmetry and weakness.   Hematological: Negative for adenopathy. Does not bruise/bleed easily.   Psychiatric/Behavioral: Negative for agitation and sleep disturbance. The patient is not nervous/anxious.      Objective:     Vital Signs (Most Recent):  Temp: 96 °F (35.6 °C) (07/06/17 0745)  Pulse: 67 (07/06/17 0745)  Resp: 16 (07/06/17 0745)  BP: (!) 121/50 (07/06/17 0745)  SpO2: 97 % (07/06/17 0745) Vital Signs (24h Range):  Temp:  [96 °F (35.6 °C)-97 °F (36.1 °C)] 96 °F (35.6 °C)  Pulse:  [52-78] 67  Resp:  [16-18] 16  SpO2:  [95 %-98 %] 97 %  BP: (108-147)/(50-93) 121/50     Weight: 64.1 kg (141 lb 5 oz)  Body mass index is 27.6 kg/m².    Intake/Output Summary (Last 24 hours) at 07/06/17 0800  Last data filed at 07/05/17 1732   Gross per 24 hour   Intake              300 ml   Output              200 ml   Net              100 ml      Physical Exam   Constitutional: She is oriented to person, place, and time. She appears well-developed and well-nourished.   Weathered female with masculinization of voice due to tobacco use    HENT:   Head: Normocephalic and atraumatic.   Right Ear: External ear normal.   Left Ear: External ear normal.   Nose: Nose normal.   Mouth/Throat: Oropharynx is clear and moist.   Eyes: EOM are normal. Pupils are equal, round, and reactive to light.   Neck: Normal range of motion. Neck supple. No JVD present. No tracheal deviation present. No thyromegaly present.   Cardiovascular: Normal rate, normal heart sounds and intact distal pulses.    No murmur heard.  Irregular rhythm with varying amplitude of apical pulse    Pulmonary/Chest: Effort normal and breath sounds normal. No respiratory distress. She has no wheezes. She has no rales. She exhibits  no tenderness.   Decreased tidal volume    Abdominal: Soft. Bowel sounds are normal. She exhibits no distension and no mass. There is no tenderness. There is no rebound and no guarding.   Musculoskeletal: Normal range of motion. She exhibits no edema or tenderness.   Lymphadenopathy:     She has no cervical adenopathy.   Neurological: She is alert and oriented to person, place, and time. She has normal reflexes. She displays normal reflexes. No cranial nerve deficit. She exhibits normal muscle tone. Coordination normal.   Skin: Skin is warm and dry. No rash noted. No erythema. No pallor.   Psychiatric: She has a normal mood and affect. Her behavior is normal. Judgment and thought content normal.       Significant Labs:   CBC:   Recent Labs  Lab 07/05/17  1121   WBC 8.13   HGB 14.6   HCT 44.6        CMP:   Recent Labs  Lab 07/05/17  1122      K 4.4      CO2 27   *   BUN 17   CREATININE 0.8   CALCIUM 9.3   PROT 6.9   ALBUMIN 3.6   BILITOT 0.5   ALKPHOS 119   AST 18   ALT 21   ANIONGAP 8   EGFRNONAA >60     Troponin:   Recent Labs  Lab 07/05/17  1122 07/05/17 2006 07/06/17  0356   TROPONINI <0.006 <0.006 <0.006     TSH:   Recent Labs  Lab 07/05/17  1122   TSH 0.783       Significant Imaging: CT: I have reviewed all pertinent results/findings within the past 24 hours and my personal findings are:  wnl  CXR: I have reviewed all pertinent results/findings within the past 24 hours and my personal findings are:  wnl  U/S: I have reviewed all pertinent results/findings within the past 24 hours and my personal findings are:  wnl  wnl    Assessment/Plan:      Smoker    [place] in smoking cessation clinic at discharge.  Nicotine patch now         Numbness of tongue    See blurred vision plan          Blurred vision    Concerning for arrhythmia causing transient decreased cerebral perfusion or TIA  EKG with NSR with PACs...   Will perform full neuro work up and monitor on tele           * Postural  dizziness with presyncope    Check orthostatics, serial enzymes; orthostatics ok.   Monitor on Tele for arrhythmia, get ECHO-to be done today.             VTE Risk Mitigation         Ordered     Medium Risk of VTE  Once      07/05/17 1404     Place sequential compression device  Until discontinued      07/05/17 1404          Rik English MD  Department of Hospital Medicine   [facility]

## 2017-07-06 NOTE — ASSESSMENT & PLAN NOTE
Check orthostatics, serial enzymes; orthostatics ok.   Monitor on Tele for arrhythmia, get ECHO-to be done today.

## 2017-07-06 NOTE — ASSESSMENT & PLAN NOTE
-MRI and  MRA brain and carotids are reassuring, but spell could have been TIA +/- cardiac in nature  -Follow up 2D echo with bubble study  -Continue Telemetry and neurochecks  -She is not a TPA candidate as this could be a stroke mimic and all symptoms are resolved  -Check A1C count  -Agree with Asprin started this admission  -Would recommend Statin therapy for goal LDL less than 100  -Ok to treat BP as needed  -Smoker urged to quit to CVA prevention  -Signs and symptoms of stroke on d/c summary and follow up with neurology per d/c summary recommended.

## 2017-07-06 NOTE — CONSULTS
"[facility]  Cardiology  Consult Note    Patient Name: Nikky Mo  MRN: [id number]  Admission Date: 7/5/2017  Hospital Length of Stay: 0 days  Code Status: Full Code   Attending Provider:   Consulting Provider: Yasmani Corcoran NP  Primary Care Physician: Ambreen Gonzalez NP  Principal Problem:Postural dizziness with presyncope        Inpatient consult to Cardiology-CIS  Consult performed by: HAILEY MENDOZA  Consult ordered by: BELA VACA        Subjective:     Chief Complaint:  presyncope    HPI: 55 y/o female smoker with no pertinent PMHx presents to the ED today with c/o presyncope. She states she was waiting for her relative in a car and became extremely weak and lightheaded upon walking around the car. She also endorses a "thick tongue" as well as slurred speech. Denies any other symptoms. Her sysmptoms lasted roughly 20 minutes. Labs benign, echo pending, carotid u/s reveals less than 50% stenosis bilaterally, CT and MRI of the brain negative, EKG NSR with PVCs. CIS asked to evaluate.    Past Medical History:   Diagnosis Date    Encounter for blood transfusion     Ganglion of knee     H/O: hysterectomy     H/O: hysterectomy     Renal calculi        Past Surgical History:   Procedure Laterality Date    HYSTERECTOMY         Review of patient's allergies indicates:  No Known Allergies    No current facility-administered medications on file prior to encounter.      No current outpatient prescriptions on file prior to encounter.     Family History     None        Social History Main Topics    Smoking status: Current Every Day Smoker     Packs/day: 1.00     Types: Cigarettes     Start date: 7/5/1978    Smokeless tobacco: Never Used    Alcohol use Yes      Comment: socially    Drug use: No    Sexual activity: No     ROS     Constitutional : Negative  EENT : Negative  CV : Negative  Respiratory : Negative  Gastrointestinal: Negative   Genitourinary: " Negative  Musculoskeletal: Negative  Skin : Negative  Neurological : AAO x 3 ; presyncope    Objective:     Vital Signs (Most Recent):  Temp: 96 °F (35.6 °C) (07/06/17 0745)  Pulse: 67 (07/06/17 1000)  Resp: 16 (07/06/17 0745)  BP: (!) 121/50 (07/06/17 0745)  SpO2: 97 % (07/06/17 0808) Vital Signs (24h Range):  Temp:  [96 °F (35.6 °C)-97 °F (36.1 °C)] 96 °F (35.6 °C)  Pulse:  [52-78] 67  Resp:  [16-18] 16  SpO2:  [95 %-98 %] 97 %  BP: (108-147)/(50-93) 121/50     Weight: 64.1 kg (141 lb 5 oz)  Body mass index is 27.6 kg/m².    SpO2: 97 %  O2 Device (Oxygen Therapy): room air      Intake/Output Summary (Last 24 hours) at 07/06/17 1035  Last data filed at 07/05/17 1732   Gross per 24 hour   Intake              300 ml   Output              200 ml   Net              100 ml       Lines/Drains/Airways     Peripheral Intravenous Line                 Peripheral IV - Single Lumen 07/05/17 1258 Left Hand less than 1 day                Physical Exam     General appearance: alert, appears stated age and cooperative  Head: Normocephalic, without obvious abnormality, atraumatic  Eyes: conjunctivae/corneas clear. PERRL  Neck: no carotid bruit, no JVD and supple, symmetrical, trachea midline  Lungs: clear to auscultation bilaterally, normal respiratory effort  Chest Wall: no tenderness  Heart: regular rate and rhythm, S1, S2 normal, no murmur, click, rub or gallop  Abdomen: soft, non-tender; bowel sounds normal; no masses,  no organomegaly  Extremities: Extremities normal, atraumatic, no cyanosis, clubbing, or edema  Pulses: Dorsalis Pedis R: 2+ (normal)/L: 2+ (normal)  Skin: Skin color, texture, turgor normal. No rashes or lesions  Neurologic: Normal mood and affect  Alert and oriented X 3      Significant Labs:   BMP:   Recent Labs  Lab 07/05/17  1122   *      K 4.4      CO2 27   BUN 17   CREATININE 0.8   CALCIUM 9.3   MG 1.8   , CMP   Recent Labs  Lab 07/05/17  1122      K 4.4      CO2 27   GLU  176*   BUN 17   CREATININE 0.8   CALCIUM 9.3   PROT 6.9   ALBUMIN 3.6   BILITOT 0.5   ALKPHOS 119   AST 18   ALT 21   ANIONGAP 8   ESTGFRAFRICA >60   EGFRNONAA >60   , CBC   Recent Labs  Lab 07/05/17  1121   WBC 8.13   HGB 14.6   HCT 44.6       and Troponin   Recent Labs  Lab 07/05/17  1122 07/05/17  2006 07/06/17  0356   TROPONINI <0.006 <0.006 <0.006         Assessment and Plan:     Active Diagnoses:    Diagnosis Date Noted POA    PRINCIPAL PROBLEM:  Postural dizziness with presyncope [R42, R55] 07/05/2017 Yes    Blurred vision [H53.8] 07/05/2017 Yes    Numbness of tongue [R20.0] 07/05/2017 Yes    Smoker [F17.200] 07/05/2017 Yes      Problems Resolved During this Admission:    Diagnosis Date Noted Date Resolved POA       VTE Risk Mitigation         Ordered     Medium Risk of VTE  Once      07/05/17 1404     Place sequential compression device  Until discontinued      07/05/17 1404        Current Facility-Administered Medications   Medication    acetaminophen tablet 650 mg    aspirin EC tablet 81 mg    hydrocodone-acetaminophen 5-325mg per tablet 1 tablet    nicotine 21 mg/24 hr 1 patch    ondansetron injection 4 mg    pantoprazole EC tablet 40 mg       Presyncope weakness for 20 mins yesterday, rather non specific, note high BS  Blurred vision  Tongue numbness  Smoker  Echo OK carotid us ok today; no shunt  Dyslipidemia ldl 141  PVCs noted    Yasmani Corcoran NP  Cardiology   [facility]    PLAN:r/o DM and treat  Quit smoking  Asa  Atorvastatin 20  F/u in clinic with 24 hr Holter and ETT      I attest that I have personally seen and examined this patient. I have reviewed and discussed the management in detail as outlined above.

## 2017-07-06 NOTE — HPI
"Nikky Mo is a 56 y.o. female smoker who presents with episode of feeling light headed yesterday with tongue tip numbness.    The patient patient was last seen normal at 10:30 am. She then got up to walk into a building but felt the world was closing in around her and her tongue felt numb and heavy. She recalls the entire event and has persistent symptoms of light headedness without vertigo for 20 minutes but the numb tongue did not fully resolve until last pm- it had improved but felt "funny"  She is back to baseline today.   She has been admitted, MRI and carotid studies have been done. Cardiology is consulted. Note her lower HR. She does not take daily ASA. She does not seek routine health care.   "

## 2017-07-06 NOTE — PROGRESS NOTES
07/05/17 1740   Vital Signs   Pulse (!) 55   Heart Rate Source Monitor   Resp 16   BP (!) 119/54   MAP (mmHg) 77   BP Location Right arm   BP Method Automatic   Patient Position Lying     SLIME Hall, ICU nurse called and states pt HR is in the 40's. Checked on pt and pt is sleeping. No distress noted.

## 2017-07-06 NOTE — SUBJECTIVE & OBJECTIVE
Past Medical History:   Diagnosis Date    Encounter for blood transfusion     Ganglion of knee     H/O: hysterectomy     H/O: hysterectomy     Renal calculi        Past Surgical History:   Procedure Laterality Date    HYSTERECTOMY         Review of patient's allergies indicates:  No Known Allergies    No current facility-administered medications on file prior to encounter.      No current outpatient prescriptions on file prior to encounter.     Family History     None        Social History Main Topics    Smoking status: Current Every Day Smoker     Packs/day: 1.00     Types: Cigarettes     Start date: 7/5/1978    Smokeless tobacco: Never Used    Alcohol use Yes      Comment: socially    Drug use: No    Sexual activity: No     Review of Systems   Constitutional: Negative.  Negative for activity change, appetite change, chills, diaphoresis, fatigue, fever and unexpected weight change.   HENT: Negative for congestion, dental problem, drooling, ear discharge, ear pain, facial swelling, hearing loss, mouth sores, nosebleeds, postnasal drip, rhinorrhea, sinus pressure, sneezing, sore throat, tinnitus, trouble swallowing and voice change.    Eyes: Positive for visual disturbance. Negative for photophobia, pain, discharge, redness and itching.   Respiratory: Negative for apnea, cough, choking, chest tightness, shortness of breath and wheezing.    Cardiovascular: Negative.  Negative for chest pain, palpitations and leg swelling.   Gastrointestinal: Negative.  Negative for abdominal distention, abdominal pain, anal bleeding, blood in stool, constipation, diarrhea, nausea, rectal pain and vomiting.   Genitourinary: Negative.  Negative for difficulty urinating, dyspareunia, dysuria, enuresis, flank pain, frequency, hematuria, menstrual problem, pelvic pain, urgency, vaginal bleeding, vaginal discharge and vaginal pain.   Musculoskeletal: Negative.  Negative for arthralgias, back pain, gait problem, joint swelling,  myalgias, neck pain and neck stiffness.   Skin: Negative.  Negative for color change, pallor, rash and wound.   Neurological: Positive for dizziness, speech difficulty, light-headedness, numbness and headaches. Negative for tremors, seizures, syncope, facial asymmetry and weakness.   Hematological: Negative for adenopathy. Does not bruise/bleed easily.   Psychiatric/Behavioral: Negative.  Negative for agitation, behavioral problems, confusion, decreased concentration, dysphoric mood, hallucinations, self-injury, sleep disturbance and suicidal ideas. The patient is not nervous/anxious and is not hyperactive.      Objective:     Vital Signs (Most Recent):  Temp: 97 °F (36.1 °C) (07/05/17 1407)  Pulse: (!) 52 (07/05/17 1800)  Resp: 16 (07/05/17 1407)  BP: (!) 146/60 (07/05/17 1407)  SpO2: 98 % (07/05/17 1405) Vital Signs (24h Range):  Temp:  [96.8 °F (36 °C)-97 °F (36.1 °C)] 97 °F (36.1 °C)  Pulse:  [52-78] 52  Resp:  [16] 16  SpO2:  [98 %] 98 %  BP: (140-146)/(60-93) 146/60     Weight: 64.1 kg (141 lb 5 oz)  Body mass index is 27.6 kg/m².    Physical Exam   Constitutional: She is oriented to person, place, and time. She appears well-developed and well-nourished.   Weathered female with masculinization of voice due to tobacco use    HENT:   Head: Normocephalic and atraumatic.   Right Ear: External ear normal.   Left Ear: External ear normal.   Nose: Nose normal.   Mouth/Throat: Oropharynx is clear and moist.   Eyes: EOM are normal. Pupils are equal, round, and reactive to light.   Neck: Normal range of motion. Neck supple. No JVD present. No tracheal deviation present. No thyromegaly present.   Cardiovascular: Normal rate, normal heart sounds and intact distal pulses.    No murmur heard.  Irregular rhythm with varying amplitude of apical pulse    Pulmonary/Chest: Effort normal and breath sounds normal. No respiratory distress. She has no wheezes. She has no rales. She exhibits no tenderness.   Decreased tidal volume     Abdominal: Soft. Bowel sounds are normal. She exhibits no distension and no mass. There is no tenderness. There is no rebound and no guarding.   Musculoskeletal: Normal range of motion. She exhibits no edema or tenderness.   Lymphadenopathy:     She has no cervical adenopathy.   Neurological: She is alert and oriented to person, place, and time. She has normal reflexes. She displays normal reflexes. No cranial nerve deficit. She exhibits normal muscle tone. Coordination normal.   Skin: Skin is warm and dry. No rash noted. No erythema. No pallor.   Psychiatric: She has a normal mood and affect. Her behavior is normal. Judgment and thought content normal.        Significant Labs:   CBC:   Recent Labs  Lab 07/05/17  1121   WBC 8.13   HGB 14.6   HCT 44.6        CMP:   Recent Labs  Lab 07/05/17  1122      K 4.4      CO2 27   *   BUN 17   CREATININE 0.8   CALCIUM 9.3   PROT 6.9   ALBUMIN 3.6   BILITOT 0.5   ALKPHOS 119   AST 18   ALT 21   ANIONGAP 8   EGFRNONAA >60       Significant Imaging: I have reviewed and interpreted all pertinent imaging results/findings within the past 24 hours.

## 2017-07-06 NOTE — HPI
"57 y/o female smoker with very sparse, spotty medical care (loose relationship with Krys Gonzalez as her PCP) presents to ED with presyncopal episode associated with blurred/diminished vision and tip of tongue numbness. She was sitting in an air conditioned vehicle this am waiting for her mother at the gym. When she stepped out of the vehicle to receive her, she immediately became dizzy, lightheaded and weak all over. Was able to manage her mother into the car, but driving proved to be too difficult. She called her sister who came to get her and her mother. Around this time she felt her vision going "in and out" and she was having trouble articulating words b/c her tongue felt "fat." Work up in ED is essentially negative. She is admitted for tele obs to rule out arrhythmia and neuro work up to rule out TIA/CVA. Currently she is having no focal deficits at time of admission   "

## 2017-07-07 VITALS
RESPIRATION RATE: 18 BRPM | TEMPERATURE: 97 F | DIASTOLIC BLOOD PRESSURE: 64 MMHG | WEIGHT: 141.31 LBS | BODY MASS INDEX: 27.74 KG/M2 | HEART RATE: 66 BPM | SYSTOLIC BLOOD PRESSURE: 128 MMHG | OXYGEN SATURATION: 97 % | HEIGHT: 60 IN

## 2017-07-07 LAB
ESTIMATED AVG GLUCOSE: 123 MG/DL
HBA1C MFR BLD HPLC: 5.9 %
TROPONIN I SERPL DL<=0.01 NG/ML-MCNC: <0.006 NG/ML

## 2017-07-07 PROCEDURE — 99217 PR OBSERVATION CARE DISCHARGE: CPT | Mod: ,,, | Performed by: FAMILY MEDICINE

## 2017-07-07 PROCEDURE — 27000339 *HC DAILY SUPPLY KIT

## 2017-07-07 PROCEDURE — 25000003 PHARM REV CODE 250: Performed by: SURGERY

## 2017-07-07 PROCEDURE — 36415 COLL VENOUS BLD VENIPUNCTURE: CPT

## 2017-07-07 PROCEDURE — 25000003 PHARM REV CODE 250: Performed by: NURSE PRACTITIONER

## 2017-07-07 PROCEDURE — G0378 HOSPITAL OBSERVATION PER HR: HCPCS

## 2017-07-07 PROCEDURE — 25000003 PHARM REV CODE 250: Performed by: FAMILY MEDICINE

## 2017-07-07 PROCEDURE — 84484 ASSAY OF TROPONIN QUANT: CPT

## 2017-07-07 RX ORDER — ASPIRIN 81 MG/1
81 TABLET ORAL DAILY
Refills: 0 | COMMUNITY
Start: 2017-07-07 | End: 2020-10-14

## 2017-07-07 RX ORDER — IBUPROFEN 200 MG
1 TABLET ORAL DAILY
Qty: 28 PATCH | Refills: 0 | Status: SHIPPED | OUTPATIENT
Start: 2017-07-07 | End: 2018-12-27

## 2017-07-07 RX ORDER — ATORVASTATIN CALCIUM 20 MG/1
20 TABLET, FILM COATED ORAL NIGHTLY
Qty: 30 TABLET | Refills: 5 | Status: SHIPPED | OUTPATIENT
Start: 2017-07-07 | End: 2017-12-16

## 2017-07-07 RX ADMIN — ATORVASTATIN CALCIUM 20 MG: 20 TABLET, FILM COATED ORAL at 09:07

## 2017-07-07 RX ADMIN — NICOTINE 1 PATCH: 21 PATCH, EXTENDED RELEASE TRANSDERMAL at 09:07

## 2017-07-07 RX ADMIN — PANTOPRAZOLE SODIUM 40 MG: 40 TABLET, DELAYED RELEASE ORAL at 09:07

## 2017-07-07 RX ADMIN — ASPIRIN 81 MG: 81 TABLET, COATED ORAL at 09:07

## 2017-07-07 NOTE — DISCHARGE INSTRUCTIONS
Dial 911 for any signs or symptoms of stroke such as sudden weakness, numbness, dizziness, speech, gait, or visual changes    Noncardiac Chest Pain    Based on your visit today, the health care provider doesnt know what is causing your chest pain. In most cases, people who come to the emergency department with chest pain dont have a problem with their heart. Instead, the pain is caused by other conditions. These may be problems with the lungs, muscles, bones, digestive tract, nerves, or mental health.  Lung problems  · Inflammation around the lungs (pleurisy)  · Collapsed lung (pneumothorax)  · Fluid around the lungs (pleural effusion)  · Lung cancer. This is a rare cause of chest pain.  Muscle or bone problems  · Inflamed cartilage between the ribs (pleurisy)  · Fibromyalgia  · Rheumatoid arthritis  Digestive system problems  · Reflux  · Stomach ulcer  · Spasms of the esophagus  · Gall stones  · Gallbladder inflammation  Mental health conditions  · Panic or anxiety attacks  · Emotional distress  Your condition doesnt seem serious and your pain doesnt appear to be coming from your heart. But sometimes the signs of a serious problem take more time to appear. Watch for the warning signs listed below.  Home care  Follow these guidelines when caring for yourself at home:  · Rest today and avoid strenuous activity.  · Take any prescribed medicine as directed.  Follow-up care  Follow up with your health care provider, or as advised, if you dont start to feel better within 24 hours.  When to seek medical advice  Call your health care provider right away if any of these occur:  · A change in the type of pain. Call if it feels different, becomes more serious, lasts longer, or begins to spread into your shoulder, arm, neck, jaw, or back.  · Shortness of breath  · You feel more pain when you breathe  · Cough with dark-colored mucus or blood  · Weakness, dizziness, or fainting  · Fever of 100.4ºF (38ºC) or higher, or as  directed by your health care provider  · Swelling, pain, or redness in one leg  Date Last Reviewed: 11/24/2014  © 9198-9046 [employer]. [address] [place] [address], [place] [address]. All rights reserved. This information is not intended as a substitute for professional medical care. Always follow your healthcare professional's instructions.

## 2017-07-07 NOTE — ASSESSMENT & PLAN NOTE
Check orthostatics, serial enzymes; orthostatics ok.   Monitor on Tele for arrhythmia, get ECHO-neg    Ok to d/c today. Will f/up with Cards for Holter and ETT outpt.

## 2017-07-07 NOTE — DISCHARGE SUMMARY
"[facility] Medicine  Discharge Summary      Patient Name: Nikky Mo  MRN: [id number]  Admission Date: 7/5/2017  Hospital Length of Stay: 0 days  Discharge Date and Time:  07/07/2017 10:40 AM  Attending Physician: Yovany Almanza MD   Discharging Provider: Mali Leach NP  Primary Care Provider: Ambreen Gonzalez NP      HPI:   57 y/o female smoker with very sparse, spotty medical care (loose relationship with Krys Gonzalez as her PCP) presents to ED with presyncopal episode associated with blurred/diminished vision and tip of tongue numbness. She was sitting in an air conditioned vehicle this am waiting for her mother at the gym. When she stepped out of the vehicle to receive her, she immediately became dizzy, lightheaded and weak all over. Was able to manage her mother into the car, but driving proved to be too difficult. She called her sister who came to get her and her mother. Around this time she felt her vision going "in and out" and she was having trouble articulating words b/c her tongue felt "fat." Work up in ED is essentially negative. She is admitted for tele obs to rule out arrhythmia and neuro work up to rule out TIA/CVA. Currently she is having no focal deficits at time of admission     * No surgery found *      Indwelling Lines/Drains at time of discharge:   Lines/Drains/Airways          No matching active lines, drains, or airways        Hospital Course:   W/up including CT head as well as MRI/MRA is negative. No arrhythmias noted. US of carotids with < 50% stenosis. Echo read to be normal as well. Pt not with symptoms this am. Reports ready to go home.      Consults:   Consults         Status Ordering Provider     Inpatient consult to Cardiology-[facility]  Once     Provider:  Bryant Garcia MD    Completed [facility]A [facility]ACA     Inpatient consult to Neurology  Once     Provider:  Moise Noyola MD    Completed BELA VACA          Significant " Diagnostic Studies: Labs:   CMP   Recent Labs  Lab 07/05/17  1122      K 4.4      CO2 27   *   BUN 17   CREATININE 0.8   CALCIUM 9.3   PROT 6.9   ALBUMIN 3.6   BILITOT 0.5   ALKPHOS 119   AST 18   ALT 21   ANIONGAP 8   ESTGFRAFRICA >60   EGFRNONAA >60    and CBC   Recent Labs  Lab 07/05/17  1121   WBC 8.13   HGB 14.6   HCT 44.6          Pending Diagnostic Studies:     None        Final Active Diagnoses:    Diagnosis Date Noted POA    PRINCIPAL PROBLEM:  Postural dizziness with presyncope [R42, R55] 07/05/2017 Yes    Blurred vision [H53.8] 07/05/2017 Yes    Numbness of tongue [R20.0] 07/05/2017 Yes    Smoker [F17.200] 07/05/2017 Yes      Problems Resolved During this Admission:    Diagnosis Date Noted Date Resolved POA      Problem List           Codes Noted - Resolved     * (Principal)Postural dizziness with presyncope ICD-10-CM: R42, R55  ICD-9-CM: 780.4, 780.2 7/5/2017 - Present     Current Assessment & Plan 7/5/2017 Hospital Encounter Written 7/7/2017  8:40 AM by Mali Leach NP      Check orthostatics, serial enzymes; orthostatics ok.   Monitor on Tele for arrhythmia, get ECHO-neg    Ok to d/c today. Will f/up with Cards for Holter and ETT outpt.               Blurred vision ICD-10-CM: H53.8  ICD-9-CM: 368.8 7/5/2017 - Present     Current Assessment & Plan 7/5/2017 Hospital Encounter Written 7/7/2017  8:40 AM by Mali Leach NP      Concerning for arrhythmia causing transient decreased cerebral perfusion or TIA  EKG with NSR with PACs.  Cards consulted, rec 24 hour Holter and ETT outpt  Neuro eval ok    A1C 5.9            Numbness of tongue ICD-10-CM: R20.0  ICD-9-CM: 782.0 7/5/2017 - Present     Current Assessment & Plan 7/5/2017 Hospital Encounter Written 7/7/2017  8:40 AM by Mali Leach NP      See blurred vision plan            Smoker ICD-10-CM: F17.200  ICD-9-CM: 305.1 7/5/2017 - Present     Current Assessment & Plan 7/5/2017 Hospital Encounter Written  7/7/2017  8:40 AM by Mali Leach NP      [place] in smoking cessation clinic at discharge.  Nicotine patch working well.                Discharged Condition: good    Disposition: Home or Self Care    Follow Up:  Follow-up Information     Ambreen Gonzalez NP In 1 week.    Specialty:  Family Medicine  Why:  Outpatient Services  Contact information:  [address] [place] [address]  [phone]             [facility] In 4 weeks.    Specialty:  Neurology  Contact information:  [address] [facility] [address]  [phone]           Bryant Garcia MD On 7/11/2017.    Specialty:  Cardiology  Why:  at 10 am for treadmill/monitor test. Please wear loose clothes and tennis shoes.   Contact information:  [address] [place] [address]  [phone]                 Patient Instructions:     Ambulatory referral to Smoking Cessation Program   Referral Priority: Routine Referral Type: Consultation   Referral Reason: Specialty Services Required    Requested Specialty: CTTS    Number of Visits Requested: 1      Diet Cardiac     Activity as tolerated       Medications:  Reconciled Home Medications:   Current Discharge Medication List      START taking these medications    Details   aspirin (ECOTRIN) 81 MG EC tablet Take 1 tablet (81 mg total) by mouth once daily.  Refills: 0      atorvastatin (LIPITOR) 20 MG tablet Take 1 tablet (20 mg total) by mouth every evening.  Qty: 30 tablet, Refills: 5      nicotine (NICODERM CQ) 21 mg/24 hr Place 1 patch onto the skin once daily.  Qty: 28 patch, Refills: 0           Time spent on the discharge of patient: 20 minutes    Mali Leach NP  Department of Hospital Medicine  [facility]

## 2017-07-07 NOTE — ASSESSMENT & PLAN NOTE
Concerning for arrhythmia causing transient decreased cerebral perfusion or TIA  EKG with NSR with PACs.  Cards consulted, rec 24 hour Holter and ETT outpt  Neuro eval ok    A1C 5.9

## 2017-07-07 NOTE — SUBJECTIVE & OBJECTIVE
Interval History: doing well this am    Review of Systems   Constitutional: Negative for chills, diaphoresis, fatigue and fever.   HENT: Negative for nosebleeds, postnasal drip, rhinorrhea, sinus pressure, sneezing and sore throat.    Eyes: Negative for visual disturbance.   Respiratory: Negative for cough, shortness of breath and wheezing.    Cardiovascular: Negative.  Negative for chest pain, palpitations and leg swelling.   Gastrointestinal: Negative for abdominal distention, abdominal pain, anal bleeding, blood in stool, constipation, diarrhea, nausea, rectal pain and vomiting.   Musculoskeletal: Negative for arthralgias, back pain and myalgias.   Neurological: Negative for dizziness, tremors, seizures, syncope, facial asymmetry, speech difficulty, weakness, light-headedness, numbness and headaches.        All symptoms resolved   Hematological: Negative for adenopathy. Does not bruise/bleed easily.   Psychiatric/Behavioral: Negative for agitation and sleep disturbance. The patient is not nervous/anxious.      Objective:     Vital Signs (Most Recent):  Temp: 97.5 °F (36.4 °C) (07/07/17 0725)  Pulse: 60 (07/07/17 0725)  Resp: 18 (07/07/17 0725)  BP: 102/64 (07/07/17 0725)  SpO2: 98 % (07/07/17 0725) Vital Signs (24h Range):  Temp:  [96.3 °F (35.7 °C)-97.5 °F (36.4 °C)] 97.5 °F (36.4 °C)  Pulse:  [48-70] 60  Resp:  [18] 18  SpO2:  [96 %-98 %] 98 %  BP: (102-126)/(52-68) 102/64     Weight: 64.1 kg (141 lb 5 oz)  Body mass index is 27.6 kg/m².    Intake/Output Summary (Last 24 hours) at 07/07/17 0837  Last data filed at 07/07/17 0000   Gross per 24 hour   Intake              720 ml   Output              500 ml   Net              220 ml      Physical Exam   Constitutional: She is oriented to person, place, and time. She appears well-developed and well-nourished.   Weathered female with masculinization of voice due to tobacco use    HENT:   Head: Normocephalic and atraumatic.   Right Ear: External ear normal.   Left  Ear: External ear normal.   Nose: Nose normal.   Mouth/Throat: Oropharynx is clear and moist.   Eyes: EOM are normal. Pupils are equal, round, and reactive to light.   Neck: Normal range of motion. Neck supple. No JVD present. No tracheal deviation present. No thyromegaly present.   Cardiovascular: Normal rate, normal heart sounds and intact distal pulses.    No murmur heard.  Irregular rhythm with varying amplitude of apical pulse    Pulmonary/Chest: Effort normal and breath sounds normal. No respiratory distress. She has no wheezes. She has no rales. She exhibits no tenderness.   Decreased tidal volume    Abdominal: Soft. Bowel sounds are normal. She exhibits no distension and no mass. There is no tenderness. There is no rebound and no guarding.   Musculoskeletal: Normal range of motion. She exhibits no edema or tenderness.   Lymphadenopathy:     She has no cervical adenopathy.   Neurological: She is alert and oriented to person, place, and time. She has normal reflexes. She displays normal reflexes. No cranial nerve deficit. She exhibits normal muscle tone. Coordination normal.   Skin: Skin is warm and dry. No rash noted. No erythema. No pallor.   Psychiatric: She has a normal mood and affect. Her behavior is normal. Judgment and thought content normal.       Significant Labs:   CBC:     Recent Labs  Lab 07/05/17  1121   WBC 8.13   HGB 14.6   HCT 44.6        CMP:     Recent Labs  Lab 07/05/17  1122      K 4.4      CO2 27   *   BUN 17   CREATININE 0.8   CALCIUM 9.3   PROT 6.9   ALBUMIN 3.6   BILITOT 0.5   ALKPHOS 119   AST 18   ALT 21   ANIONGAP 8   EGFRNONAA >60     Troponin:     Recent Labs  Lab 07/06/17  1155 07/06/17  1909 07/07/17  0349   TROPONINI <0.006 <0.006 <0.006     TSH:     Recent Labs  Lab 07/05/17  1122   TSH 0.783       Significant Imaging: CT: I have reviewed all pertinent results/findings within the past 24 hours and my personal findings are:  wnl  CXR: I have reviewed  all pertinent results/findings within the past 24 hours and my personal findings are:  wnl  U/S: I have reviewed all pertinent results/findings within the past 24 hours and my personal findings are:  wnl  wnl   ECHO wnl

## 2017-07-07 NOTE — PROGRESS NOTES
"[facility] Medicine  Progress Note    Patient Name: Nikky Mo  MRN: [id number]  Patient Class: OP- Observation   Admission Date: 7/5/2017  Length of Stay: 0 days  Attending Physician: Yovany Almanza MD  Primary Care Provider: Ambreen Gonzalez NP        Subjective:     Principal Problem:Postural dizziness with presyncope    HPI:  57 y/o female smoker with very sparse, spotty medical care (loose relationship with Krys Gonzalez as her PCP) presents to ED with presyncopal episode associated with blurred/diminished vision and tip of tongue numbness. She was sitting in an air conditioned vehicle this am waiting for her mother at the gym. When she stepped out of the vehicle to receive her, she immediately became dizzy, lightheaded and weak all over. Was able to manage her mother into the car, but driving proved to be too difficult. She called her sister who came to get her and her mother. Around this time she felt her vision going "in and out" and she was having trouble articulating words b/c her tongue felt "fat." Work up in ED is essentially negative. She is admitted for tele obs to rule out arrhythmia and neuro work up to rule out TIA/CVA. Currently she is having no focal deficits at time of admission     Hospital Course:  W/up including CT head as well as MRI/MRA is negative. No arrhythmias noted. US of carotids with < 50% stenosis. Echo read to be normal as well. Pt not with symptoms this am. Reports ready to go home.     Interval History: doing well this am    Review of Systems   Constitutional: Negative for chills, diaphoresis, fatigue and fever.   HENT: Negative for nosebleeds, postnasal drip, rhinorrhea, sinus pressure, sneezing and sore throat.    Eyes: Negative for visual disturbance.   Respiratory: Negative for cough, shortness of breath and wheezing.    Cardiovascular: Negative.  Negative for chest pain, palpitations and leg swelling.   Gastrointestinal: Negative for abdominal " distention, abdominal pain, anal bleeding, blood in stool, constipation, diarrhea, nausea, rectal pain and vomiting.   Musculoskeletal: Negative for arthralgias, back pain and myalgias.   Neurological: Negative for dizziness, tremors, seizures, syncope, facial asymmetry, speech difficulty, weakness, light-headedness, numbness and headaches.        All symptoms resolved   Hematological: Negative for adenopathy. Does not bruise/bleed easily.   Psychiatric/Behavioral: Negative for agitation and sleep disturbance. The patient is not nervous/anxious.      Objective:     Vital Signs (Most Recent):  Temp: 97.5 °F (36.4 °C) (07/07/17 0725)  Pulse: 60 (07/07/17 0725)  Resp: 18 (07/07/17 0725)  BP: 102/64 (07/07/17 0725)  SpO2: 98 % (07/07/17 0725) Vital Signs (24h Range):  Temp:  [96.3 °F (35.7 °C)-97.5 °F (36.4 °C)] 97.5 °F (36.4 °C)  Pulse:  [48-70] 60  Resp:  [18] 18  SpO2:  [96 %-98 %] 98 %  BP: (102-126)/(52-68) 102/64     Weight: 64.1 kg (141 lb 5 oz)  Body mass index is 27.6 kg/m².    Intake/Output Summary (Last 24 hours) at 07/07/17 0837  Last data filed at 07/07/17 0000   Gross per 24 hour   Intake              720 ml   Output              500 ml   Net              220 ml      Physical Exam   Constitutional: She is oriented to person, place, and time. She appears well-developed and well-nourished.   Weathered female with masculinization of voice due to tobacco use    HENT:   Head: Normocephalic and atraumatic.   Right Ear: External ear normal.   Left Ear: External ear normal.   Nose: Nose normal.   Mouth/Throat: Oropharynx is clear and moist.   Eyes: EOM are normal. Pupils are equal, round, and reactive to light.   Neck: Normal range of motion. Neck supple. No JVD present. No tracheal deviation present. No thyromegaly present.   Cardiovascular: Normal rate, normal heart sounds and intact distal pulses.    No murmur heard.  Irregular rhythm with varying amplitude of apical pulse    Pulmonary/Chest: Effort normal and  breath sounds normal. No respiratory distress. She has no wheezes. She has no rales. She exhibits no tenderness.   Decreased tidal volume    Abdominal: Soft. Bowel sounds are normal. She exhibits no distension and no mass. There is no tenderness. There is no rebound and no guarding.   Musculoskeletal: Normal range of motion. She exhibits no edema or tenderness.   Lymphadenopathy:     She has no cervical adenopathy.   Neurological: She is alert and oriented to person, place, and time. She has normal reflexes. She displays normal reflexes. No cranial nerve deficit. She exhibits normal muscle tone. Coordination normal.   Skin: Skin is warm and dry. No rash noted. No erythema. No pallor.   Psychiatric: She has a normal mood and affect. Her behavior is normal. Judgment and thought content normal.       Significant Labs:   CBC:     Recent Labs  Lab 07/05/17  1121   WBC 8.13   HGB 14.6   HCT 44.6        CMP:     Recent Labs  Lab 07/05/17  1122      K 4.4      CO2 27   *   BUN 17   CREATININE 0.8   CALCIUM 9.3   PROT 6.9   ALBUMIN 3.6   BILITOT 0.5   ALKPHOS 119   AST 18   ALT 21   ANIONGAP 8   EGFRNONAA >60     Troponin:     Recent Labs  Lab 07/06/17  1155 07/06/17  1909 07/07/17  0349   TROPONINI <0.006 <0.006 <0.006     TSH:     Recent Labs  Lab 07/05/17  1122   TSH 0.783       Significant Imaging: CT: I have reviewed all pertinent results/findings within the past 24 hours and my personal findings are:  wnl  CXR: I have reviewed all pertinent results/findings within the past 24 hours and my personal findings are:  wnl  U/S: I have reviewed all pertinent results/findings within the past 24 hours and my personal findings are:  wnl  wnl   ECHO wnl    Assessment/Plan:      Smoker    [place] in smoking cessation clinic at discharge.  Nicotine patch working well.         Numbness of tongue    See blurred vision plan          Blurred vision    Concerning for arrhythmia causing transient decreased  cerebral perfusion or TIA  EKG with NSR with PACs.  Cards consulted, rec 24 hour Holter and ETT outpt  Neuro eval ok    A1C 5.9          * Postural dizziness with presyncope    Check orthostatics, serial enzymes; orthostatics ok.   Monitor on Tele for arrhythmia, get ECHO-neg    Ok to d/c today. Will f/up with Cards for Holter and ETT outpt.               VTE Risk Mitigation         Ordered     Medium Risk of VTE  Once      07/05/17 1404     Place sequential compression device  Until discontinued      07/05/17 1404          Rik English MD  Department of Hospital Medicine   [facility]

## 2017-07-07 NOTE — PROGRESS NOTES
Talked with patient about Smoking Cessation [employer] Project here at [facility]. Gave patient information brochure with program number and website. Patient will call for appointment after discharge.

## 2017-07-07 NOTE — PROGRESS NOTES
"[facility]  Cardiology  Progress Note    Patient Name: Nikky Mo  MRN: [id number]  Admission Date: 7/5/2017  Hospital Length of Stay: 0 days  Code Status: Full Code   Attending Physician: Yovany Almanza MD   Primary Care Physician: Ambreen Gonzalez NP  Expected Discharge Date:   Principal Problem:Postural dizziness with presyncope    Subjective:     Hospital Course: stable for DC today with early outpatient follow up.     Interval History: 57 y/o female smoker with no pertinent PMHx presents to the ED today with c/o presyncope. She states she was waiting for her relative in a car and became extremely weak and lightheaded upon walking around the car. She also endorses a "thick tongue" as well as slurred speech. Denies any other symptoms. Her sysmptoms lasted roughly 20 minutes. Labs benign, echo pending, carotid u/s reveals less than 50% stenosis bilaterally, CT and MRI of the brain negative, EKG NSR with PVCs. CIS asked to evaluate.    ROS   Constitutional : Negative  EENT : Negative  CV : Negative  Respiratory : Negative  Gastrointestinal: Negative   Genitourinary: Negative  Musculoskeletal: Negative  Skin : Negative  Neurological : AAO x 3 ; presyncope     Objective:     Vital Signs (Most Recent):  Temp: 97.5 °F (36.4 °C) (07/07/17 0725)  Pulse: 60 (07/07/17 0725)  Resp: 18 (07/07/17 0725)  BP: 102/64 (07/07/17 0725)  SpO2: 98 % (07/07/17 0725) Vital Signs (24h Range):  Temp:  [96.3 °F (35.7 °C)-97.5 °F (36.4 °C)] 97.5 °F (36.4 °C)  Pulse:  [48-70] 60  Resp:  [18] 18  SpO2:  [96 %-98 %] 98 %  BP: (102-126)/(52-68) 102/64     Weight: 64.1 kg (141 lb 5 oz)  Body mass index is 27.6 kg/m².    SpO2: 98 %  O2 Device (Oxygen Therapy): room air      Intake/Output Summary (Last 24 hours) at 07/07/17 0823  Last data filed at 07/07/17 0000   Gross per 24 hour   Intake              720 ml   Output              500 ml   Net              220 ml       Lines/Drains/Airways     Peripheral Intravenous " Line                 Peripheral IV - Single Lumen 07/06/17 1204 Right Hand less than 1 day                Physical Exam  General appearance: alert, appears stated age and cooperative  Head: Normocephalic, without obvious abnormality, atraumatic  Eyes: conjunctivae/corneas clear. PERRL  Neck: no carotid bruit, no JVD and supple, symmetrical, trachea midline  Lungs: clear to auscultation bilaterally, normal respiratory effort  Chest Wall: no tenderness  Heart: regular rate and rhythm, S1, S2 normal, no murmur, click, rub or gallop  Abdomen: soft, non-tender; bowel sounds normal; no masses,  no organomegaly  Extremities: Extremities normal, atraumatic, no cyanosis, clubbing, or edema  Pulses: Dorsalis Pedis R: 2+ (normal)/L: 2+ (normal)  Skin: Skin color, texture, turgor normal. No rashes or lesions  Neurologic: Normal mood and affect  Alert and oriented X 3  Significant Labs:   Blood Culture: No results for input(s): LABBLOO in the last 48 hours., BMP:   Recent Labs  Lab 07/05/17  1122   *      K 4.4      CO2 27   BUN 17   CREATININE 0.8   CALCIUM 9.3   MG 1.8   , CMP   Recent Labs  Lab 07/05/17  1122      K 4.4      CO2 27   *   BUN 17   CREATININE 0.8   CALCIUM 9.3   PROT 6.9   ALBUMIN 3.6   BILITOT 0.5   ALKPHOS 119   AST 18   ALT 21   ANIONGAP 8   ESTGFRAFRICA >60   EGFRNONAA >60   , CBC   Recent Labs  Lab 07/05/17  1121   WBC 8.13   HGB 14.6   HCT 44.6      , INR   Recent Labs  Lab 07/05/17  1122   INR 1.0   , Lipid Panel   Recent Labs  Lab 07/06/17  0356   CHOL 211*   HDL 41   LDLCALC 141.4   TRIG 143   CHOLHDL 19.4*   ,   Pathology Results  (Last 10 years)    None      , Troponin   Recent Labs  Lab 07/06/17  1155 07/06/17  1909 07/07/17  0349   TROPONINI <0.006 <0.006 <0.006    and All pertinent lab results from the last 24 hours have been reviewed.    Significant Imaging: CT scan: CT ABDOMEN PELVIS WITH CONTRAST: No results found for this visit on 07/05/17. and CT  ABDOMEN PELVIS WITHOUT CONTRAST: No results found for this visit on 07/05/17., Echocardiogram: 2D echo with color flow doppler: No results found for this or any previous visit., EKG: , Stress Test:  and X-Ray:   Assessment and Plan:       Active Diagnoses:    Diagnosis Date Noted POA    PRINCIPAL PROBLEM:  Postural dizziness with presyncope [R42, R55] 07/05/2017 Yes    Blurred vision [H53.8] 07/05/2017 Yes    Numbness of tongue [R20.0] 07/05/2017 Yes    Smoker [F17.200] 07/05/2017 Yes      Problems Resolved During this Admission:    Diagnosis Date Noted Date Resolved POA       VTE Risk Mitigation         Ordered     Medium Risk of VTE  Once      07/05/17 1404     Place sequential compression device  Until discontinued      07/05/17 1404        Current Facility-Administered Medications   Medication    acetaminophen tablet 650 mg    aspirin EC tablet 81 mg    atorvastatin tablet 20 mg    hydrocodone-acetaminophen 5-325mg per tablet 1 tablet    nicotine 21 mg/24 hr 1 patch    ondansetron injection 4 mg    pantoprazole EC tablet 40 mg   Presyncope weakness for 20 mins yesterday now resolved, rather non specific, note high BS  Blurred vision  Tongue numbness  Smoker  Echo OK carotid us ok today; no shunt  Dyslipidemia ldl 141  PVCs noted    PLAN:r/o DM and treat  Quit smoking  Asa  Atorvastatin 20  F/u in clinic with 24 hr Holter and ETT       KIM Florian  Cardiology  [facility]  I attest that I have personally seen and examined this patient. I have reviewed and discussed the management in detail as outlined above.

## 2017-07-07 NOTE — PLAN OF CARE
Problem: Patient Care Overview  Goal: Plan of Care Review  Patient free of falls or injuries, pt denies any numbness , tingling, blurred vision or pain, neuro checks negative for changes from baseline. Will continue to monitor.

## 2017-07-13 ENCOUNTER — TELEPHONE (OUTPATIENT)
Dept: FAMILY MEDICINE | Facility: CLINIC | Age: 57
End: 2017-07-13

## 2017-07-13 ENCOUNTER — OFFICE VISIT (OUTPATIENT)
Dept: FAMILY MEDICINE | Facility: CLINIC | Age: 57
End: 2017-07-13
Payer: COMMERCIAL

## 2017-07-13 VITALS
HEIGHT: 60 IN | DIASTOLIC BLOOD PRESSURE: 82 MMHG | HEART RATE: 84 BPM | SYSTOLIC BLOOD PRESSURE: 122 MMHG | WEIGHT: 141.63 LBS | BODY MASS INDEX: 27.8 KG/M2

## 2017-07-13 DIAGNOSIS — H53.8 BLURRED VISION, BILATERAL: Primary | ICD-10-CM

## 2017-07-13 DIAGNOSIS — I25.10 ASCVD (ARTERIOSCLEROTIC CARDIOVASCULAR DISEASE): ICD-10-CM

## 2017-07-13 DIAGNOSIS — F17.200 SMOKER: ICD-10-CM

## 2017-07-13 DIAGNOSIS — J43.8 OTHER EMPHYSEMA: ICD-10-CM

## 2017-07-13 PROBLEM — J43.9 PULMONARY EMPHYSEMA: Status: ACTIVE | Noted: 2017-07-13

## 2017-07-13 PROCEDURE — 99999 PR PBB SHADOW E&M-EST. PATIENT-LVL II: CPT | Mod: PBBFAC,,, | Performed by: FAMILY MEDICINE

## 2017-07-13 PROCEDURE — 99213 OFFICE O/P EST LOW 20 MIN: CPT | Mod: S$GLB,,, | Performed by: FAMILY MEDICINE

## 2017-07-13 NOTE — PROGRESS NOTES
Subjective:       Patient ID: Nikky Mo is a 56 y.o. female.    Chief Complaint: Follow-up    Pt is a 56 y.o. female who presents for check up for dizziness and blurry vision. Doing well on current meds. Denies any side effects. Prevention is up to date.      Review of Systems   Constitutional: Negative for appetite change, chills and fever.   HENT: Negative for rhinorrhea, sinus pressure, sore throat and trouble swallowing.         Eyes are blurry   Respiratory: Negative for cough, chest tightness, shortness of breath and wheezing.    Cardiovascular: Negative for chest pain and palpitations.   Gastrointestinal: Negative for abdominal pain, blood in stool, diarrhea, nausea and vomiting.   Genitourinary: Negative for dysuria, flank pain, hematuria, pelvic pain, urgency, vaginal bleeding, vaginal discharge and vaginal pain.   Musculoskeletal: Negative for back pain, joint swelling and neck stiffness.   Skin: Negative for rash.   Neurological: Negative for dizziness, weakness, light-headedness, numbness and headaches.   Hematological: Does not bruise/bleed easily.   Psychiatric/Behavioral: Negative for agitation. The patient is not nervous/anxious.        Objective:      Physical Exam   Constitutional: She is oriented to person, place, and time. She appears well-developed and well-nourished.   HENT:   Head: Normocephalic.   Eyes: Pupils are equal, round, and reactive to light.   Neck: Normal range of motion. Neck supple. No thyromegaly present.   Cardiovascular: Normal rate and regular rhythm.    Pulmonary/Chest: No respiratory distress. She has no wheezes. She has no rales. She exhibits no tenderness.   Abdominal: She exhibits no distension. There is no tenderness. There is no rebound and no guarding.   Musculoskeletal: Normal range of motion. She exhibits no edema or tenderness.   Lymphadenopathy:     She has no cervical adenopathy.   Neurological: She is alert and oriented to person, place, and time. She has  normal reflexes. She displays normal reflexes. No cranial nerve deficit. She exhibits normal muscle tone. Coordination normal.   Skin: Skin is warm and dry. No rash noted. No pallor.   Psychiatric: She has a normal mood and affect. Judgment and thought content normal.       Assessment:       1. Blurred vision, bilateral        Plan:   Nikky was seen today for follow-up.    Diagnoses and all orders for this visit:    Blurred vision, bilateral  -     Ambulatory referral to Optometry

## 2017-07-13 NOTE — TELEPHONE ENCOUNTER
Referral and attachments faxed to Dr April Grande/optometry--pt has a copy of the referral and will call for the appt.  Ph [phone], fx [phone]

## 2017-07-28 DIAGNOSIS — Z12.31 OTHER SCREENING MAMMOGRAM: ICD-10-CM

## 2017-08-02 ENCOUNTER — TELEPHONE (OUTPATIENT)
Dept: FAMILY MEDICINE | Facility: CLINIC | Age: 57
End: 2017-08-02

## 2017-08-02 DIAGNOSIS — M79.89 PAIN AND SWELLING OF LOWER LEG, RIGHT: ICD-10-CM

## 2017-08-02 DIAGNOSIS — M79.661 PAIN AND SWELLING OF LOWER LEG, RIGHT: ICD-10-CM

## 2017-08-02 DIAGNOSIS — M79.661 PAIN IN RIGHT LOWER LEG: ICD-10-CM

## 2017-08-02 DIAGNOSIS — N20.0 KIDNEY STONE: Primary | ICD-10-CM

## 2017-08-02 DIAGNOSIS — R20.0 NUMBNESS OF RIGHT FOOT: ICD-10-CM

## 2017-08-02 RX ORDER — TAMSULOSIN HYDROCHLORIDE 0.4 MG/1
0.4 CAPSULE ORAL DAILY
Qty: 30 CAPSULE | Refills: 0 | Status: SHIPPED | OUTPATIENT
Start: 2017-08-02 | End: 2020-10-14 | Stop reason: ALTCHOICE

## 2017-08-02 RX ORDER — TRAMADOL HYDROCHLORIDE 50 MG/1
50 TABLET ORAL EVERY 12 HOURS PRN
Qty: 30 TABLET | Refills: 0 | Status: SHIPPED | OUTPATIENT
Start: 2017-08-02 | End: 2017-08-12

## 2017-08-02 NOTE — TELEPHONE ENCOUNTER
----- Message from [place] sent at 2017  1:57 PM CDT -----  Contact: self  Nikky Mo  MRN: [id number]  : 1960  PCP: Ambreen Gonzalez  Home Phone      [phone]  Work Phone      Not on file.  Mobile          [phone]      MESSAGE: pt has a kidney stone, has some flomax left,but has no pain meds or antibiotics, can elfego call some in for her?      Pharmacy: [facility]    Phone: [phone]

## 2017-08-02 NOTE — TELEPHONE ENCOUNTER
Pt stated she did not see a doctor but she knows that she has a kidney stone. Pt stated she has them often . Pt has pain in her back and travels to the bottom of her stomach.  Offered to schedule pt an appt, she declined and wanted to see if you would like call in meds.

## 2017-08-22 DIAGNOSIS — Z12.11 COLON CANCER SCREENING: ICD-10-CM

## 2017-09-01 ENCOUNTER — OFFICE VISIT (OUTPATIENT)
Dept: OBSTETRICS AND GYNECOLOGY | Facility: CLINIC | Age: 57
End: 2017-09-01
Payer: COMMERCIAL

## 2017-09-01 ENCOUNTER — HOSPITAL ENCOUNTER (OUTPATIENT)
Dept: RADIOLOGY | Facility: HOSPITAL | Age: 57
Discharge: HOME | End: 2017-09-01
Attending: NURSE PRACTITIONER
Payer: COMMERCIAL

## 2017-09-01 ENCOUNTER — PROCEDURE VISIT (OUTPATIENT)
Dept: OBSTETRICS AND GYNECOLOGY | Facility: CLINIC | Age: 57
End: 2017-09-01
Payer: COMMERCIAL

## 2017-09-01 VITALS
HEART RATE: 78 BPM | HEIGHT: 60 IN | SYSTOLIC BLOOD PRESSURE: 120 MMHG | BODY MASS INDEX: 27.88 KG/M2 | WEIGHT: 142 LBS | DIASTOLIC BLOOD PRESSURE: 68 MMHG | RESPIRATION RATE: 10 BRPM

## 2017-09-01 DIAGNOSIS — Z90.710 HISTORY OF HYSTERECTOMY: ICD-10-CM

## 2017-09-01 DIAGNOSIS — Z12.11 COLON CANCER SCREENING: ICD-10-CM

## 2017-09-01 DIAGNOSIS — Z01.419 WELL WOMAN EXAM WITH ROUTINE GYNECOLOGICAL EXAM: Primary | ICD-10-CM

## 2017-09-01 DIAGNOSIS — Z12.31 OTHER SCREENING MAMMOGRAM: ICD-10-CM

## 2017-09-01 DIAGNOSIS — R10.30 LOWER ABDOMINAL PAIN: ICD-10-CM

## 2017-09-01 PROCEDURE — 76830 TRANSVAGINAL US NON-OB: CPT | Mod: S$GLB,,, | Performed by: OBSTETRICS & GYNECOLOGY

## 2017-09-01 PROCEDURE — 99999 PR PBB SHADOW E&M-EST. PATIENT-LVL III: CPT | Mod: PBBFAC,,, | Performed by: OBSTETRICS & GYNECOLOGY

## 2017-09-01 PROCEDURE — 77067 SCR MAMMO BI INCL CAD: CPT | Mod: 26,,, | Performed by: RADIOLOGY

## 2017-09-01 PROCEDURE — 99386 PREV VISIT NEW AGE 40-64: CPT | Mod: S$GLB,,, | Performed by: OBSTETRICS & GYNECOLOGY

## 2017-09-01 PROCEDURE — 77067 SCR MAMMO BI INCL CAD: CPT | Mod: TC

## 2017-09-01 PROCEDURE — 77063 BREAST TOMOSYNTHESIS BI: CPT | Mod: 26,,, | Performed by: RADIOLOGY

## 2017-09-01 NOTE — PROGRESS NOTES
Subjective:    Patient ID: Nikky Mo is a 56 y.o. y.o. female.     Chief Complaint: Annual Well Woman Exam     History of Present Illness:  Nikky presents today for Annual Well Woman exam. She describes her menses as absent since hysterectomy.She denies pelvic pain.  She denies breast tenderness, masses, nipple discharge. She admits to difficulty with urination or bowel movements; reports constipation. She denies menopausal symptoms such as hotflashes, vaginal dryness, and night sweats. She denies bloating, early satiety, or weight changes. She is sexually active. Contraception is by no method.    Patient reports over the last few months she is experiencing bilateral lower abdominal pain, intermittent in nature. Stabbing. Reports constipation. Denies urinary symptoms.       Menstrual History:   No LMP recorded. Patient has had a hysterectomy..     OB History      Para Term  AB Living    6 3 3   3 3    SAB TAB Ectopic Multiple Live Births    3       3          The following portions of the patient's history were reviewed and updated as appropriate: allergies, current medications, past family history, past medical history, past social history, past surgical history and problem list.    ROS:   CONSTITUTIONAL: Negative for fever, chills, diaphoresis, weakness, fatigue, weight loss, weight gain  ENT: negative for sore throat, nasal congestion, nasal discharge, epistaxis, tinnitus, hearing loss  EYES: negative for blurry vision, decreased vision, loss of vision, eye pain, diplopia, photophobia, discharge  SKIN: Negative for rash, itching, hives  RESPIRATORY: negative for cough, hemoptysis, shortness of breath, pleuritic chest pain, wheezing  CARDIOVASCULAR: negative for chest pain, dyspnea on exertion, orthopnea, paroxysmal nocturnal dyspnea, edema, palpitations  BREAST: negative for breast  tenderness, breast mass, nipple discharge, or skin changes  GASTROINTESTINAL: negative for abdominal pain, flank  pain, nausea, vomiting, diarrhea, constipation, black stool, blood in stool  GENITOURINARY: negative for abnormal vaginal bleeding, amenorrhea, decreased libido, dysuria, genital sores, hematuria, incontinence, menorrhagia, pelvic pain, urinary frequency, vaginal discharge  HEMATOLOGIC/LYMPHATIC: negative for swollen lymph nodes, bleeding, bruising  MUSCULOSKELETAL: negative for back pain, joint pain, joint stiffness, joint swelling, muscle pain, muscle weakness  NEUROLOGICAL: negative for dizzy/vertigo, headache, focal weakness, numbness/tingling, speech problems, loss of consciousness, confusion, memory loss  BEHAVORIAL/PSYCH: negative for anxiety, depression, psychosis  ENDOCRINE: negative for polydipsia/polyuria, palpitations, skin changes, temperature intolerance, unexpected weight changes  ALLERGIC/IMMUNOLOGIC: negative for urticaria, hay fever, angioedema      Objective:    Vital Signs:  Vitals:    09/01/17 1240   BP: 120/68   Pulse: 78   Resp: 10   Weight: 64.4 kg (142 lb)   Height: 5' (1.524 m)         Physical Exam:  General:  alert, cooperative, appears stated age   Skin:  Skin color, texture, turgor normal. No rashes or lesions   HEENT:  conjunctivae/corneas clear. PERRL.   Neck: supple, trachea midline, no adenopathy or thyromegally   Respiratory:  clear to auscultation bilaterally   Heart:  regular rate and rhythm, S1, S2 normal, no murmur, click, rub or gallop   Breasts:  no discharge, erythema, or tenderness   Abdomen:  normal findings: bowel sounds normal, no masses palpable, no organomegaly and soft, non-tender   Pelvis: External genitalia: normal general appearance  Urinary system: urethral meatus normal, bladder nontender  Vaginal: atrophic mucosa  Cervix: absent, removed surgically  Uterus: absent, removed surgically  Adnexa: non palpable   Extremities: Normal ROM; no edema, no cyanosis   Neurologial: Normal strength and tone. No focal numbness or weakness. Reflexes 2+ and equal.   Psychiatric:  normal mood, speech, dress, and thought processes         Assessment:       Healthy female exam.     1. Well woman exam with routine gynecological exam    2. Other screening mammogram    3. Colon cancer screening    4. Lower abdominal pain    5. History of hysterectomy          Plan:       Pap smear deferred    MMG ordered  TVUS  Colonoscopy ordered  Discussed weight bearing exercise, calcium, and vitamin d for osteoporosis prevention  RTC in 1 year    COUNSELING:  Nikky was counseled on STD pevention, use and side-effects of various contraceptive measures, A.C.O.G. Pap guidelines and recommendations for yearly pelvic exams in addition to recommendations for monthly self breast exams; to see her PCP for other health maintenance.

## 2017-09-05 ENCOUNTER — TELEPHONE (OUTPATIENT)
Dept: FAMILY MEDICINE | Facility: CLINIC | Age: 57
End: 2017-09-05

## 2017-09-05 NOTE — TELEPHONE ENCOUNTER
----- Message from Latrice Valentin sent at 2017  9:52 AM CDT -----  Contact: Self  Nikky Mo  MRN: [id number]  : 1960  PCP: Ambreen Gonzalez  Home Phone      [phone]  Work Phone      Not on file.  Mobile          [phone]    MESSAGE: Returning call for results of mammogram    Phone:  [phone]

## 2017-12-15 ENCOUNTER — TELEPHONE (OUTPATIENT)
Dept: FAMILY MEDICINE | Facility: CLINIC | Age: 57
End: 2017-12-15

## 2017-12-15 NOTE — TELEPHONE ENCOUNTER
----- Message from Mimi Plunkett MA sent at 12/15/2017  8:47 AM CST -----  Contact: Self  Nikky Mo  MRN: [id number]  : 1960  PCP: Ambreen Gonzalez  Home Phone      [phone]  Work Phone      Not on file.  Mobile          [phone]      MESSAGE: Patient c/o congestion. Reports that she has been taking Zyrtec with little relief. Patient denies any fever/pain. Would like to schedule an appointment today if possible. Please advise.      Phone number: [phone]

## 2017-12-16 ENCOUNTER — OFFICE VISIT (OUTPATIENT)
Dept: FAMILY MEDICINE | Facility: CLINIC | Age: 57
End: 2017-12-16
Payer: COMMERCIAL

## 2017-12-16 VITALS
WEIGHT: 139.31 LBS | SYSTOLIC BLOOD PRESSURE: 124 MMHG | RESPIRATION RATE: 20 BRPM | HEIGHT: 60 IN | BODY MASS INDEX: 27.35 KG/M2 | DIASTOLIC BLOOD PRESSURE: 82 MMHG | HEART RATE: 76 BPM | TEMPERATURE: 98 F

## 2017-12-16 DIAGNOSIS — K64.1 GRADE II HEMORRHOIDS: ICD-10-CM

## 2017-12-16 DIAGNOSIS — J00 ACUTE NASOPHARYNGITIS: Primary | ICD-10-CM

## 2017-12-16 PROCEDURE — 99999 PR PBB SHADOW E&M-EST. PATIENT-LVL III: CPT | Mod: PBBFAC,,, | Performed by: FAMILY MEDICINE

## 2017-12-16 PROCEDURE — 96372 THER/PROPH/DIAG INJ SC/IM: CPT | Mod: S$GLB,,, | Performed by: FAMILY MEDICINE

## 2017-12-16 PROCEDURE — 99214 OFFICE O/P EST MOD 30 MIN: CPT | Mod: 25,S$GLB,, | Performed by: FAMILY MEDICINE

## 2017-12-16 RX ORDER — OXYBUTYNIN CHLORIDE 5 MG/1
TABLET, EXTENDED RELEASE ORAL
Refills: 3 | COMMUNITY
Start: 2017-12-06 | End: 2018-12-27

## 2017-12-16 RX ORDER — FLUTICASONE PROPIONATE 50 MCG
2 SPRAY, SUSPENSION (ML) NASAL DAILY
Qty: 1 BOTTLE | Refills: 4 | Status: SHIPPED | OUTPATIENT
Start: 2017-12-16 | End: 2018-01-15

## 2017-12-16 RX ORDER — METHYLPREDNISOLONE ACETATE 40 MG/ML
40 INJECTION, SUSPENSION INTRA-ARTICULAR; INTRALESIONAL; INTRAMUSCULAR; SOFT TISSUE
Status: COMPLETED | OUTPATIENT
Start: 2017-12-16 | End: 2017-12-16

## 2017-12-16 RX ORDER — ROSUVASTATIN CALCIUM 20 MG/1
TABLET, COATED ORAL
Refills: 1 | COMMUNITY
Start: 2017-10-28 | End: 2018-12-27

## 2017-12-16 RX ORDER — BENZONATATE 100 MG/1
100 CAPSULE ORAL 3 TIMES DAILY PRN
Qty: 30 CAPSULE | Refills: 0 | Status: SHIPPED | OUTPATIENT
Start: 2017-12-16 | End: 2018-01-15

## 2017-12-16 RX ORDER — HYDROCORTISONE ACETATE 25 MG/1
25 SUPPOSITORY RECTAL 2 TIMES DAILY
Qty: 20 SUPPOSITORY | Refills: 0 | Status: SHIPPED | OUTPATIENT
Start: 2017-12-16 | End: 2017-12-26

## 2017-12-16 RX ADMIN — METHYLPREDNISOLONE ACETATE 40 MG: 40 INJECTION, SUSPENSION INTRA-ARTICULAR; INTRALESIONAL; INTRAMUSCULAR; SOFT TISSUE at 12:12

## 2017-12-16 NOTE — PROGRESS NOTES
Subjective:       Patient ID: Nikky Mo is a 57 y.o. female.    Chief Complaint: Nasal Congestion and Hemorrhoids    HPI  57 year old female comes in with c/o congestion and cough. She hasn't had any fevers or sore throat. She feels like she has some dullness in her ears and they are 'popping.' She has been taking tylenol. She has been feeling bad for about 4 days. She has had some sick contacts but no flu.    She does have hemorrhoids. She says that she has been using tucks pads. She has to  on her mom and has had some improvement with having her sister take her 'shift.' She says that she does have some bleeding.    PMH, PSH, ALLERGIES, SH, FH reviewed in nurse's notes above  Medications reconciled in the nurse's notes      Review of Systems   Constitutional: Negative for chills and fever.   HENT: Positive for congestion. Negative for ear pain, postnasal drip, rhinorrhea, sore throat and trouble swallowing.    Eyes: Negative for redness and itching.   Respiratory: Positive for cough. Negative for shortness of breath and wheezing.    Cardiovascular: Negative for chest pain and palpitations.   Gastrointestinal: Positive for blood in stool. Negative for abdominal pain, diarrhea, nausea and vomiting.        +hemorrhoids   Genitourinary: Negative for dysuria and frequency.   Skin: Negative for rash.   Neurological: Negative for weakness and headaches.       Objective:      Physical Exam   Constitutional: She is oriented to person, place, and time. She appears well-developed. No distress.   HENT:   Head: Normocephalic and atraumatic.   Eyes: Conjunctivae are normal. Pupils are equal, round, and reactive to light.   Neck: Normal range of motion. Neck supple. No thyromegaly present.   Cardiovascular: Normal rate, regular rhythm, normal heart sounds and intact distal pulses.    Pulmonary/Chest: Effort normal and breath sounds normal. No respiratory distress. She has no wheezes.   Abdominal: Soft. Bowel sounds are  normal. There is no tenderness.   Genitourinary:   Genitourinary Comments: + nonthrombosed bleeding hemorrhoid.   Musculoskeletal: Normal range of motion. She exhibits no edema.   Lymphadenopathy:     She has no cervical adenopathy.   Neurological: She is alert and oriented to person, place, and time.   Skin: Skin is warm and dry. No rash noted.   Psychiatric: She has a normal mood and affect. Her behavior is normal.   Nursing note and vitals reviewed.       Assessment/Plan:       Problem List Items Addressed This Visit     None      Visit Diagnoses     Acute nasopharyngitis    -  Primary    Relevant Medications    methylPREDNISolone acetate injection 40 mg (Completed)    benzonatate (TESSALON) 100 MG capsule    fluticasone (FLONASE) 50 mcg/actuation nasal spray    Grade II hemorrhoids        Relevant Medications    hydrocortisone (ANUSOL-HC) 25 mg suppository        RTC if condition acutely worsens or any other concerns, otherwise RTC as scheduled

## 2017-12-19 ENCOUNTER — TELEPHONE (OUTPATIENT)
Dept: FAMILY MEDICINE | Facility: CLINIC | Age: 57
End: 2017-12-19

## 2017-12-19 DIAGNOSIS — K64.1 GRADE II HEMORRHOIDS: ICD-10-CM

## 2017-12-19 RX ORDER — HYDROCORTISONE 25 MG/G
CREAM TOPICAL 2 TIMES DAILY
Qty: 30 G | Refills: 3 | Status: SHIPPED | OUTPATIENT
Start: 2017-12-19 | End: 2017-12-29

## 2017-12-19 RX ORDER — LIDOCAINE AND PRILOCAINE 25; 25 MG/G; MG/G
CREAM TOPICAL
Qty: 30 G | Refills: 1 | Status: SHIPPED | OUTPATIENT
Start: 2017-12-19 | End: 2018-12-27 | Stop reason: SDUPTHER

## 2017-12-19 RX ORDER — LIDOCAINE AND PRILOCAINE 25; 25 MG/G; MG/G
CREAM TOPICAL
Qty: 30 G | Refills: 0 | Status: SHIPPED | OUTPATIENT
Start: 2017-12-19 | End: 2020-10-14 | Stop reason: ALTCHOICE

## 2017-12-19 NOTE — TELEPHONE ENCOUNTER
PA for Anucort- HC 25mg Suppository has been submitting through cover my meds.  Pending for approval.  KEY: [id number]

## 2017-12-19 NOTE — TELEPHONE ENCOUNTER
Dr. Herrera's pt.  I completed a Pa for this medication this morning, still waiting on response.  Chava would like to know if possible can we switch it to something that insurance will cover?  Please advise, thank you     MESSAGE:   Insurance is not covering hydrocortisone (ANUSOL-HC) 25 mg suppository so he would like to switch to RX Emla .  Please call.    Phone:  [phone]

## 2017-12-19 NOTE — TELEPHONE ENCOUNTER
----- Message from Latrice Valentin sent at 2017 11:55 AM CST -----  Contact: Etienne Mo  MRN: [id number]  : 1960  PCP: Ambreen Gonzalez  Home Phone      [phone]  Work Phone      Not on file.  Mobile          [phone]    MESSAGE:   Insurance is not covering hydrocortisone (ANUSOL-HC) 25 mg suppository so he would like to switch to RX Emla .  Please call.    Phone:  [phone]

## 2017-12-19 NOTE — TELEPHONE ENCOUNTER
----- Message from Latrice Valentin sent at 2017 11:55 AM CST -----  Contact: Etienne Mo  MRN: [id number]  : 1960  PCP: Ambreen Gonzalez  Home Phone      [phone]  Work Phone      Not on file.  Mobile          [phone]    MESSAGE:   Insurance is not covering hydrocortisone (ANUSOL-HC) 25 mg suppository so he would like to switch to RX Emla .  Please call.    Phone:  [phone]

## 2017-12-27 NOTE — TELEPHONE ENCOUNTER
Received a denial Determination letter from [employer] for   Anucort-HC 25mg Suppository's.  They also sent an appeal request form that was attached. Papers are on your desk if you wish to review them.    While waiting for the PA decision to come back for this Dr. Myles sent in a rectal cream for the pt. SPoke to pt about the cream and she said that the cream does not help that much. It numbs the rectal area for a little while but the pain always comes back.  Can something else be called in?

## 2017-12-29 NOTE — TELEPHONE ENCOUNTER
If she is still having pain, she may need to come back for another evaluation. Her last visit she did not have a thrombosed hemorrhoid. However, it may be now.  If the topical meds haven't improved her pain, please have her come back.    She can also try otc meds - witch hazel/tucks pads, preparation H.  mh

## 2018-05-03 ENCOUNTER — HOSPITAL ENCOUNTER (EMERGENCY)
Facility: HOSPITAL | Age: 58
Discharge: HOME | End: 2018-05-03
Payer: COMMERCIAL

## 2018-05-03 VITALS
BODY MASS INDEX: 28.12 KG/M2 | RESPIRATION RATE: 18 BRPM | HEART RATE: 62 BPM | DIASTOLIC BLOOD PRESSURE: 48 MMHG | TEMPERATURE: 96 F | WEIGHT: 144 LBS | SYSTOLIC BLOOD PRESSURE: 114 MMHG

## 2018-05-03 DIAGNOSIS — R10.9 RIGHT FLANK PAIN: ICD-10-CM

## 2018-05-03 DIAGNOSIS — N20.0 NEPHROLITHIASIS: Primary | ICD-10-CM

## 2018-05-03 LAB
ALBUMIN SERPL BCP-MCNC: 4 G/DL
ALP SERPL-CCNC: 115 U/L
ALT SERPL W/O P-5'-P-CCNC: 27 U/L
ANION GAP SERPL CALC-SCNC: 9 MMOL/L
AST SERPL-CCNC: 25 U/L
BACTERIA #/AREA URNS HPF: ABNORMAL /HPF
BASOPHILS # BLD AUTO: 0.03 K/UL
BASOPHILS NFR BLD: 0.4 %
BILIRUB SERPL-MCNC: 0.6 MG/DL
BILIRUB UR QL STRIP: NEGATIVE
BUN SERPL-MCNC: 11 MG/DL
CALCIUM SERPL-MCNC: 9.9 MG/DL
CHLORIDE SERPL-SCNC: 104 MMOL/L
CLARITY UR: CLEAR
CO2 SERPL-SCNC: 26 MMOL/L
COLOR UR: YELLOW
CREAT SERPL-MCNC: 0.7 MG/DL
DIFFERENTIAL METHOD: ABNORMAL
EOSINOPHIL # BLD AUTO: 0.2 K/UL
EOSINOPHIL NFR BLD: 2.5 %
ERYTHROCYTE [DISTWIDTH] IN BLOOD BY AUTOMATED COUNT: 13.6 %
EST. GFR  (AFRICAN AMERICAN): >60 ML/MIN/1.73 M^2
EST. GFR  (NON AFRICAN AMERICAN): >60 ML/MIN/1.73 M^2
GLUCOSE SERPL-MCNC: 94 MG/DL
GLUCOSE UR QL STRIP: NEGATIVE
HCT VFR BLD AUTO: 48.1 %
HGB BLD-MCNC: 16.1 G/DL
HGB UR QL STRIP: ABNORMAL
KETONES UR QL STRIP: NEGATIVE
LEUKOCYTE ESTERASE UR QL STRIP: NEGATIVE
LYMPHOCYTES # BLD AUTO: 2.4 K/UL
LYMPHOCYTES NFR BLD: 29 %
MCH RBC QN AUTO: 30 PG
MCHC RBC AUTO-ENTMCNC: 33.5 G/DL
MCV RBC AUTO: 90 FL
MICROSCOPIC COMMENT: ABNORMAL
MONOCYTES # BLD AUTO: 0.7 K/UL
MONOCYTES NFR BLD: 7.7 %
NEUTROPHILS # BLD AUTO: 5.1 K/UL
NEUTROPHILS NFR BLD: 60.4 %
NITRITE UR QL STRIP: NEGATIVE
PH UR STRIP: 6 [PH] (ref 5–8)
PLATELET # BLD AUTO: 255 K/UL
PMV BLD AUTO: 9.7 FL
POTASSIUM SERPL-SCNC: 4.2 MMOL/L
PROT SERPL-MCNC: 7.5 G/DL
PROT UR QL STRIP: NEGATIVE
RBC # BLD AUTO: 5.37 M/UL
RBC #/AREA URNS HPF: 3 /HPF (ref 0–4)
SODIUM SERPL-SCNC: 139 MMOL/L
SP GR UR STRIP: 1.02 (ref 1–1.03)
URN SPEC COLLECT METH UR: ABNORMAL
UROBILINOGEN UR STRIP-ACNC: NEGATIVE EU/DL
WBC # BLD AUTO: 8.39 K/UL

## 2018-05-03 PROCEDURE — 96360 HYDRATION IV INFUSION INIT: CPT

## 2018-05-03 PROCEDURE — 25000003 PHARM REV CODE 250: Performed by: NURSE PRACTITIONER

## 2018-05-03 PROCEDURE — 36415 COLL VENOUS BLD VENIPUNCTURE: CPT

## 2018-05-03 PROCEDURE — 81000 URINALYSIS NONAUTO W/SCOPE: CPT

## 2018-05-03 PROCEDURE — 99284 EMERGENCY DEPT VISIT MOD MDM: CPT | Mod: 25

## 2018-05-03 PROCEDURE — 80053 COMPREHEN METABOLIC PANEL: CPT

## 2018-05-03 PROCEDURE — 85025 COMPLETE CBC W/AUTO DIFF WBC: CPT

## 2018-05-03 PROCEDURE — 96361 HYDRATE IV INFUSION ADD-ON: CPT

## 2018-05-03 RX ORDER — ONDANSETRON 4 MG/1
4 TABLET, ORALLY DISINTEGRATING ORAL EVERY 8 HOURS PRN
Qty: 9 TABLET | Refills: 0 | Status: SHIPPED | OUTPATIENT
Start: 2018-05-03 | End: 2018-05-06

## 2018-05-03 RX ORDER — KETOROLAC TROMETHAMINE 10 MG/1
10 TABLET, FILM COATED ORAL EVERY 6 HOURS
Qty: 10 TABLET | Refills: 0 | Status: SHIPPED | OUTPATIENT
Start: 2018-05-03 | End: 2018-05-08

## 2018-05-03 RX ADMIN — SODIUM CHLORIDE 1000 ML: 0.9 INJECTION, SOLUTION INTRAVENOUS at 11:05

## 2018-05-03 NOTE — ED TRIAGE NOTES
57 y.o. female presents to ER   Chief Complaint   Patient presents with    Flank Pain     right sided flank pain radiating to abdomen and down right leg   Onset this AM, pt reports nausea. No acute distress noted.

## 2018-05-03 NOTE — DISCHARGE INSTRUCTIONS
**Drink plenty fluids. Get plenty rest. Wash hands frequently.    **Follow up with urology in 24-48 hours. Return to ER with worsening of symptoms.     **Our goal in the emergency department is to always give you outstanding care and exceptional service. You may receive a survey by mail or e-mail in the next week regarding your experience in our ED. We would greatly appreciate your completing and returning the survey. Your feedback provides us with a way to recognize our staff who give very good care and it helps us learn how to improve when your experience was below our aspiration of excellence.

## 2018-05-03 NOTE — ED PROVIDER NOTES
Encounter Date: 5/3/2018       History     Chief Complaint   Patient presents with    Flank Pain     right sided flank pain radiating to abdomen and down right leg     Patient presents with R flank pain radiating to her R abdomen, new onset this AM. Pain also radiated down her R leg. Denies trauma or injury. Earlier today she rates her pain 10/10, currently her pain is 2/10. She tried no medications PTA, reports that pain decreased independently. She had nausea earlier when pain was intense, but nausea has since resolved. Denies dysuria, frequency, urgency, hematuria. Reports that she has had kidney stones in the past. Recently went to urology and got an US; was told that she had a kidney stone, but that it was too small to require intervention.      The history is provided by the patient.     Review of patient's allergies indicates:  No Known Allergies  Past Medical History:   Diagnosis Date    Encounter for blood transfusion     Ganglion of knee     H/O: hysterectomy     H/O: hysterectomy     Renal calculi      Past Surgical History:   Procedure Laterality Date    HYSTERECTOMY       Family History   Problem Relation Age of Onset    Breast cancer Neg Hx     Colon cancer Neg Hx     Ovarian cancer Neg Hx      Social History   Substance Use Topics    Smoking status: Current Every Day Smoker     Packs/day: 1.00     Types: Cigarettes     Start date: 7/5/1978    Smokeless tobacco: Never Used    Alcohol use Yes      Comment: socially     Review of Systems   Constitutional: Negative for chills, fatigue and fever.   HENT: Negative for congestion, dental problem, ear pain, rhinorrhea, sore throat and trouble swallowing.    Eyes: Negative for pain, discharge, redness and visual disturbance.   Respiratory: Negative for cough, chest tightness, shortness of breath and wheezing.    Cardiovascular: Negative for chest pain, palpitations and leg swelling.   Gastrointestinal: Positive for abdominal pain (R flank pain  radiates to R abdomen). Negative for constipation, diarrhea, nausea and vomiting.   Genitourinary: Positive for flank pain (Right). Negative for difficulty urinating, dysuria, frequency, hematuria and urgency.   Musculoskeletal: Negative for arthralgias, back pain and myalgias.   Skin: Negative for color change, pallor and rash.   Neurological: Negative for seizures, weakness and headaches.   Psychiatric/Behavioral: Negative.        Physical Exam     Initial Vitals [05/03/18 1006]   BP Pulse Resp Temp SpO2   (!) 140/75 76 20 96.3 °F (35.7 °C) --      MAP       96.67         Physical Exam    Nursing note and vitals reviewed.  Constitutional: No distress.   HENT:   Head: Normocephalic and atraumatic.   Right Ear: External ear normal.   Left Ear: External ear normal.   Nose: Nose normal.   Mouth/Throat: Oropharynx is clear and moist.   Eyes: Conjunctivae, EOM and lids are normal. Pupils are equal, round, and reactive to light.   Neck: Normal range of motion. Neck supple.   Cardiovascular: Normal rate, regular rhythm, S1 normal, S2 normal and intact distal pulses.   Pulmonary/Chest: Effort normal and breath sounds normal. No respiratory distress. She has no wheezes. She has no rhonchi.   Abdominal: Soft. Bowel sounds are normal. She exhibits no distension. There is no tenderness. There is CVA tenderness (mild, R). There is no rigidity, no rebound, no guarding and no tenderness at McBurney's point.   Musculoskeletal: Normal range of motion.   Lymphadenopathy:     She has no cervical adenopathy.   Neurological: She is alert and oriented to person, place, and time. She has normal strength.   Skin: Skin is warm and dry. Capillary refill takes less than 2 seconds. No rash noted.   Psychiatric: She has a normal mood and affect. Her speech is normal and behavior is normal.         ED Course   Procedures  Labs Reviewed   URINALYSIS - Abnormal; Notable for the following:        Result Value    Occult Blood UA 2+ (*)     All  other components within normal limits   URINALYSIS MICROSCOPIC - Abnormal; Notable for the following:     Bacteria, UA Few (*)     All other components within normal limits   CBC W/ AUTO DIFFERENTIAL - Abnormal; Notable for the following:     Hemoglobin 16.1 (*)     All other components within normal limits   COMPREHENSIVE METABOLIC PANEL          X-Rays:   Independently Interpreted Readings:   Other Readings:  Ct stone study: Nonobstructing right-sided nephrolithiasis.  No obstructive uropathy is seen.  Right-sided renal cyst and left-sided peripelvic cysts are unchanged.    Constipation.    Calcified atheromatous disease of the aorta and its branch vessels.       Medications   sodium chloride 0.9% bolus 1,000 mL (1,000 mLs Intravenous New Bag 5/3/18 1112)           Patient denies need for pain medication or nausea medication at this time.                  Clinical Impression:   The primary encounter diagnosis was Nephrolithiasis. A diagnosis of Right flank pain was also pertinent to this visit.    Disposition:   Disposition: Discharged  Condition: Stable     The patient acknowledges that close follow up with medical provider is required. Instructed to follow up with urology within 2 days. Patient was given specific return precautions. The patient agrees to comply with all instruction and directions given in the ER.     New Prescriptions    KETOROLAC (TORADOL) 10 MG TABLET    Take 1 tablet (10 mg total) by mouth every 6 (six) hours.    ONDANSETRON (ZOFRAN-ODT) 4 MG TBDL    Take 1 tablet (4 mg total) by mouth every 8 (eight) hours as needed (nausea).                           Tonya Funes NP  05/03/18 4174

## 2018-12-27 ENCOUNTER — OFFICE VISIT (OUTPATIENT)
Dept: FAMILY MEDICINE | Facility: CLINIC | Age: 58
End: 2018-12-27
Payer: COMMERCIAL

## 2018-12-27 VITALS
RESPIRATION RATE: 18 BRPM | HEART RATE: 76 BPM | DIASTOLIC BLOOD PRESSURE: 68 MMHG | WEIGHT: 139.38 LBS | SYSTOLIC BLOOD PRESSURE: 111 MMHG | BODY MASS INDEX: 26.31 KG/M2 | HEIGHT: 61 IN

## 2018-12-27 DIAGNOSIS — Z00.00 WELLNESS EXAMINATION: ICD-10-CM

## 2018-12-27 DIAGNOSIS — Z11.59 NEED FOR HEPATITIS C SCREENING TEST: ICD-10-CM

## 2018-12-27 DIAGNOSIS — Z23 NEED FOR TETANUS, DIPHTHERIA, AND ACELLULAR PERTUSSIS (TDAP) VACCINE IN PATIENT OF ADOLESCENT AGE OR OLDER: ICD-10-CM

## 2018-12-27 DIAGNOSIS — Z12.39 BREAST CANCER SCREENING: Primary | ICD-10-CM

## 2018-12-27 LAB
ALBUMIN SERPL BCP-MCNC: 3.8 G/DL
ALP SERPL-CCNC: 109 U/L
ALT SERPL W/O P-5'-P-CCNC: 24 U/L
ANION GAP SERPL CALC-SCNC: 10 MMOL/L
AST SERPL-CCNC: 24 U/L
BASOPHILS # BLD AUTO: 0.03 K/UL
BASOPHILS NFR BLD: 0.3 %
BILIRUB SERPL-MCNC: 0.6 MG/DL
BILIRUB SERPL-MCNC: NORMAL MG/DL
BLOOD URINE, POC: NORMAL
BUN SERPL-MCNC: 11 MG/DL
CALCIUM SERPL-MCNC: 10 MG/DL
CHLORIDE SERPL-SCNC: 103 MMOL/L
CHOLEST SERPL-MCNC: 212 MG/DL
CHOLEST/HDLC SERPL: 4.9 {RATIO}
CO2 SERPL-SCNC: 27 MMOL/L
COLOR, POC UA: NORMAL
CREAT SERPL-MCNC: 0.8 MG/DL
DIFFERENTIAL METHOD: NORMAL
EOSINOPHIL # BLD AUTO: 0.2 K/UL
EOSINOPHIL NFR BLD: 2.4 %
ERYTHROCYTE [DISTWIDTH] IN BLOOD BY AUTOMATED COUNT: 13.2 %
EST. GFR  (AFRICAN AMERICAN): >60 ML/MIN/1.73 M^2
EST. GFR  (NON AFRICAN AMERICAN): >60 ML/MIN/1.73 M^2
ESTIMATED AVG GLUCOSE: 123 MG/DL
GLUCOSE SERPL-MCNC: 114 MG/DL
GLUCOSE UR QL STRIP: NORMAL
HBA1C MFR BLD HPLC: 5.9 %
HCT VFR BLD AUTO: 47.4 %
HDLC SERPL-MCNC: 43 MG/DL
HDLC SERPL: 20.3 %
HGB BLD-MCNC: 15.5 G/DL
KETONES UR QL STRIP: NORMAL
LDLC SERPL CALC-MCNC: 143.2 MG/DL
LEUKOCYTE ESTERASE URINE, POC: NORMAL
LYMPHOCYTES # BLD AUTO: 2.8 K/UL
LYMPHOCYTES NFR BLD: 31.3 %
MCH RBC QN AUTO: 29.7 PG
MCHC RBC AUTO-ENTMCNC: 32.7 G/DL
MCV RBC AUTO: 91 FL
MONOCYTES # BLD AUTO: 0.7 K/UL
MONOCYTES NFR BLD: 7.6 %
NEUTROPHILS # BLD AUTO: 5.2 K/UL
NEUTROPHILS NFR BLD: 58.4 %
NITRITE, POC UA: NORMAL
NONHDLC SERPL-MCNC: 169 MG/DL
PH, POC UA: 5
PLATELET # BLD AUTO: 305 K/UL
PMV BLD AUTO: 9.7 FL
POTASSIUM SERPL-SCNC: 4.3 MMOL/L
PROT SERPL-MCNC: 7.6 G/DL
PROTEIN, POC: NORMAL
RBC # BLD AUTO: 5.22 M/UL
SODIUM SERPL-SCNC: 140 MMOL/L
SPECIFIC GRAVITY, POC UA: 1.02
TRIGL SERPL-MCNC: 129 MG/DL
TSH SERPL DL<=0.005 MIU/L-ACNC: 0.74 UIU/ML
UROBILINOGEN, POC UA: NORMAL
WBC # BLD AUTO: 8.82 K/UL

## 2018-12-27 PROCEDURE — 90471 IMMUNIZATION ADMIN: CPT | Mod: S$GLB,,, | Performed by: NURSE PRACTITIONER

## 2018-12-27 PROCEDURE — 90715 TDAP VACCINE 7 YRS/> IM: CPT | Mod: S$GLB,,, | Performed by: NURSE PRACTITIONER

## 2018-12-27 PROCEDURE — 99396 PREV VISIT EST AGE 40-64: CPT | Mod: 25,S$GLB,, | Performed by: NURSE PRACTITIONER

## 2018-12-27 PROCEDURE — 84443 ASSAY THYROID STIM HORMONE: CPT

## 2018-12-27 PROCEDURE — 80053 COMPREHEN METABOLIC PANEL: CPT

## 2018-12-27 PROCEDURE — 81001 URINALYSIS AUTO W/SCOPE: CPT | Mod: S$GLB,,, | Performed by: NURSE PRACTITIONER

## 2018-12-27 PROCEDURE — 85025 COMPLETE CBC W/AUTO DIFF WBC: CPT

## 2018-12-27 PROCEDURE — 36415 COLL VENOUS BLD VENIPUNCTURE: CPT | Mod: S$GLB,,, | Performed by: FAMILY MEDICINE

## 2018-12-27 PROCEDURE — 80061 LIPID PANEL: CPT

## 2018-12-27 PROCEDURE — 99999 PR PBB SHADOW E&M-EST. PATIENT-LVL IV: CPT | Mod: PBBFAC,,, | Performed by: NURSE PRACTITIONER

## 2018-12-27 PROCEDURE — 86803 HEPATITIS C AB TEST: CPT

## 2018-12-27 PROCEDURE — 87086 URINE CULTURE/COLONY COUNT: CPT

## 2018-12-27 PROCEDURE — 83036 HEMOGLOBIN GLYCOSYLATED A1C: CPT

## 2018-12-27 NOTE — PROGRESS NOTES
Subjective:       Patient ID: Nikky Mo is a 58 y.o. female.    Chief Complaint: Wellness Exam    Here for wellness visit.      Review of Systems   Constitutional: Negative.  Negative for appetite change, fatigue and fever.   HENT: Negative.  Negative for congestion, ear pain and sore throat.    Eyes: Negative.  Negative for visual disturbance.   Respiratory: Negative.  Negative for cough, shortness of breath and wheezing.    Cardiovascular: Negative.    Gastrointestinal: Negative.  Negative for abdominal pain, diarrhea, nausea and vomiting.   Endocrine: Negative.    Genitourinary: Negative.  Negative for difficulty urinating and urgency.   Musculoskeletal: Negative.  Negative for arthralgias and myalgias.   Skin: Negative.  Negative for color change.   Allergic/Immunologic: Negative.    Neurological: Negative.  Negative for dizziness and headaches.   Hematological: Negative.    Psychiatric/Behavioral: Negative.  Negative for sleep disturbance.   All other systems reviewed and are negative.      Objective:      Physical Exam   Constitutional: She is oriented to person, place, and time. She appears well-developed and well-nourished.   HENT:   Head: Normocephalic and atraumatic.   Right Ear: External ear normal.   Left Ear: External ear normal.   Nose: Nose normal.   Mouth/Throat: Oropharynx is clear and moist.   Eyes: Conjunctivae and EOM are normal. Pupils are equal, round, and reactive to light.   Neck: Normal range of motion. Neck supple. No thyromegaly present.   Cardiovascular: Normal rate, regular rhythm and normal heart sounds.   No murmur heard.  Pulmonary/Chest: Effort normal and breath sounds normal. No respiratory distress.   Abdominal: Soft. Bowel sounds are normal. There is no tenderness.   Musculoskeletal: Normal range of motion.   Lymphadenopathy:     She has no cervical adenopathy.   Neurological: She is alert and oriented to person, place, and time. She has normal reflexes.   Skin: Skin is warm  and dry. No rash noted.   Psychiatric: She has a normal mood and affect. Her behavior is normal. Judgment and thought content normal.   Nursing note and vitals reviewed.      Assessment:       1. Breast cancer screening    2. Need for tetanus, diphtheria, and acellular pertussis (Tdap) vaccine in patient of adolescent age or older    3. Wellness examination        Plan:   Nikky was seen today for wellness exam.    Diagnoses and all orders for this visit:    Breast cancer screening  -     Mammo Digital Screening Bilateral With CAD; Future    Need for tetanus, diphtheria, and acellular pertussis (Tdap) vaccine in patient of adolescent age or older  -     (In Office Administered) Tdap Vaccine    Wellness examination  -     CBC auto differential  -     Comprehensive metabolic panel  -     Lipid panel  -     TSH  -     Hemoglobin A1c  -     POCT urinalysis, dipstick or tablet reag    RTC PRN - 1 year for wellness

## 2018-12-28 ENCOUNTER — TELEPHONE (OUTPATIENT)
Dept: FAMILY MEDICINE | Facility: CLINIC | Age: 58
End: 2018-12-28

## 2018-12-28 DIAGNOSIS — E11.9 TYPE 2 DIABETES MELLITUS WITHOUT COMPLICATION, WITHOUT LONG-TERM CURRENT USE OF INSULIN: Primary | ICD-10-CM

## 2018-12-28 LAB
BACTERIA UR CULT: NORMAL
HCV AB SERPL QL IA: NEGATIVE

## 2018-12-28 RX ORDER — METFORMIN HYDROCHLORIDE 500 MG/1
500 TABLET, EXTENDED RELEASE ORAL
Qty: 90 TABLET | Refills: 3 | Status: SHIPPED | OUTPATIENT
Start: 2018-12-28 | End: 2020-10-14 | Stop reason: ALTCHOICE

## 2018-12-28 NOTE — TELEPHONE ENCOUNTER
----- Message from Cara Russell sent at 2018  3:59 PM CST -----  Contact: [facility]  Nikky Mo  MRN: [id number]  : 1960  PCP: Ambreen Gonzalez  Home Phone      [phone]  Work Phone      Not on file.  Mobile          [phone]      MESSAGE:   Patient would like to get test results.    Name of test (lab, mammo, etc.):  labs  Date of test:    Ordering provider: Krys  Where was the test performed:  Our lab  Comments:      Phone: [phone]

## 2018-12-28 NOTE — TELEPHONE ENCOUNTER
Pt was seen in clinic yesterday--she is calling today with c/o sinus congestion--she said that you would send her in antibiotics to [facility] if necessary.

## 2018-12-28 NOTE — TELEPHONE ENCOUNTER
----- Message from Rowena Luevano sent at 2018 12:47 PM CST -----  Contact: Self  Nikky Mo  MRN: [id number]  : 1960  PCP: Ambreen Gonzalez  Home Phone      [phone]  Work Phone      Not on file.  Mobile          [phone]      MESSAGE:   Patient would like to get medical advice.  Symptoms (please be specific):  Congestion  How long has patient had these symptoms:  A few days  Pharmacy name and location:  [facility]  Any drug allergies:  N/A  Comments: Patient would like something to be called in for her    Phone: [phone]

## 2019-01-15 ENCOUNTER — HOSPITAL ENCOUNTER (OUTPATIENT)
Dept: RADIOLOGY | Facility: HOSPITAL | Age: 59
Discharge: HOME | End: 2019-01-15
Attending: NURSE PRACTITIONER
Payer: COMMERCIAL

## 2019-01-15 VITALS — BODY MASS INDEX: 26.24 KG/M2 | HEIGHT: 61 IN | WEIGHT: 139 LBS

## 2019-01-15 DIAGNOSIS — Z12.39 BREAST CANCER SCREENING: ICD-10-CM

## 2019-01-15 PROCEDURE — 77067 SCR MAMMO BI INCL CAD: CPT | Mod: TC

## 2019-01-15 PROCEDURE — 77067 MAMMO DIGITAL SCREENING BILAT WITH TOMOSYNTHESIS_CAD: ICD-10-PCS | Mod: 26,,, | Performed by: RADIOLOGY

## 2019-01-15 PROCEDURE — 77063 MAMMO DIGITAL SCREENING BILAT WITH TOMOSYNTHESIS_CAD: ICD-10-PCS | Mod: 26,,, | Performed by: RADIOLOGY

## 2019-01-15 PROCEDURE — 77063 BREAST TOMOSYNTHESIS BI: CPT | Mod: 26,,, | Performed by: RADIOLOGY

## 2019-01-15 PROCEDURE — 77067 SCR MAMMO BI INCL CAD: CPT | Mod: 26,,, | Performed by: RADIOLOGY

## 2019-09-19 DIAGNOSIS — Z12.11 COLON CANCER SCREENING: ICD-10-CM

## 2019-12-19 ENCOUNTER — TELEPHONE (OUTPATIENT)
Dept: ADMINISTRATIVE | Facility: OTHER | Age: 59
End: 2019-12-19

## 2019-12-27 ENCOUNTER — TELEPHONE (OUTPATIENT)
Dept: ADMINISTRATIVE | Facility: OTHER | Age: 59
End: 2019-12-27

## 2020-09-17 DIAGNOSIS — Z12.39 BREAST CANCER SCREENING: ICD-10-CM

## 2020-10-02 DIAGNOSIS — Z12.11 COLON CANCER SCREENING: ICD-10-CM

## 2020-10-14 ENCOUNTER — OFFICE VISIT (OUTPATIENT)
Dept: FAMILY MEDICINE | Facility: CLINIC | Age: 60
End: 2020-10-14
Payer: COMMERCIAL

## 2020-10-14 VITALS
HEART RATE: 64 BPM | SYSTOLIC BLOOD PRESSURE: 124 MMHG | HEIGHT: 61 IN | WEIGHT: 151 LBS | TEMPERATURE: 97 F | DIASTOLIC BLOOD PRESSURE: 72 MMHG | BODY MASS INDEX: 28.51 KG/M2 | RESPIRATION RATE: 16 BRPM

## 2020-10-14 DIAGNOSIS — R73.09 ELEVATED GLUCOSE: ICD-10-CM

## 2020-10-14 DIAGNOSIS — Z00.00 ROUTINE CHECK-UP: Primary | ICD-10-CM

## 2020-10-14 LAB
ALBUMIN SERPL BCP-MCNC: 4 G/DL (ref 3.5–5.2)
ALP SERPL-CCNC: 112 U/L (ref 55–135)
ALT SERPL W/O P-5'-P-CCNC: 23 U/L (ref 10–44)
ANION GAP SERPL CALC-SCNC: 10 MMOL/L (ref 8–16)
AST SERPL-CCNC: 21 U/L (ref 10–40)
BASOPHILS # BLD AUTO: 0.04 K/UL (ref 0–0.2)
BASOPHILS NFR BLD: 0.5 % (ref 0–1.9)
BILIRUB SERPL-MCNC: 0.7 MG/DL (ref 0.1–1)
BUN SERPL-MCNC: 12 MG/DL (ref 6–20)
CALCIUM SERPL-MCNC: 10 MG/DL (ref 8.7–10.5)
CHLORIDE SERPL-SCNC: 103 MMOL/L (ref 95–110)
CHOLEST SERPL-MCNC: 227 MG/DL (ref 120–199)
CHOLEST/HDLC SERPL: 4.9 {RATIO} (ref 2–5)
CO2 SERPL-SCNC: 27 MMOL/L (ref 23–29)
CREAT SERPL-MCNC: 0.8 MG/DL (ref 0.5–1.4)
DIFFERENTIAL METHOD: NORMAL
EOSINOPHIL # BLD AUTO: 0.3 K/UL (ref 0–0.5)
EOSINOPHIL NFR BLD: 4.1 % (ref 0–8)
ERYTHROCYTE [DISTWIDTH] IN BLOOD BY AUTOMATED COUNT: 13.5 % (ref 11.5–14.5)
EST. GFR  (AFRICAN AMERICAN): >60 ML/MIN/1.73 M^2
EST. GFR  (NON AFRICAN AMERICAN): >60 ML/MIN/1.73 M^2
GLUCOSE SERPL-MCNC: 112 MG/DL (ref 70–110)
HCT VFR BLD AUTO: 46.6 % (ref 37–48.5)
HDLC SERPL-MCNC: 46 MG/DL (ref 40–75)
HDLC SERPL: 20.3 % (ref 20–50)
HGB BLD-MCNC: 15 G/DL (ref 12–16)
IMM GRANULOCYTES # BLD AUTO: 0.03 K/UL (ref 0–0.04)
IMM GRANULOCYTES NFR BLD AUTO: 0.4 % (ref 0–0.5)
LDLC SERPL CALC-MCNC: 156.8 MG/DL (ref 63–159)
LYMPHOCYTES # BLD AUTO: 2.7 K/UL (ref 1–4.8)
LYMPHOCYTES NFR BLD: 36.1 % (ref 18–48)
MCH RBC QN AUTO: 29.9 PG (ref 27–31)
MCHC RBC AUTO-ENTMCNC: 32.2 G/DL (ref 32–36)
MCV RBC AUTO: 93 FL (ref 82–98)
MONOCYTES # BLD AUTO: 0.6 K/UL (ref 0.3–1)
MONOCYTES NFR BLD: 8.6 % (ref 4–15)
NEUTROPHILS # BLD AUTO: 3.7 K/UL (ref 1.8–7.7)
NEUTROPHILS NFR BLD: 50.3 % (ref 38–73)
NONHDLC SERPL-MCNC: 181 MG/DL
NRBC BLD-RTO: 0 /100 WBC
PLATELET # BLD AUTO: 276 K/UL (ref 150–350)
PMV BLD AUTO: 10.4 FL (ref 9.2–12.9)
POTASSIUM SERPL-SCNC: 4.3 MMOL/L (ref 3.5–5.1)
PROT SERPL-MCNC: 7.6 G/DL (ref 6–8.4)
RBC # BLD AUTO: 5.02 M/UL (ref 4–5.4)
SODIUM SERPL-SCNC: 140 MMOL/L (ref 136–145)
TRIGL SERPL-MCNC: 121 MG/DL (ref 30–150)
WBC # BLD AUTO: 7.36 K/UL (ref 3.9–12.7)

## 2020-10-14 PROCEDURE — 90471 IMMUNIZATION ADMIN: CPT | Mod: S$GLB,,, | Performed by: NURSE PRACTITIONER

## 2020-10-14 PROCEDURE — 86703 HIV-1/HIV-2 1 RESULT ANTBDY: CPT

## 2020-10-14 PROCEDURE — 99396 PR PREVENTIVE VISIT,EST,40-64: ICD-10-PCS | Mod: 25,S$GLB,, | Performed by: NURSE PRACTITIONER

## 2020-10-14 PROCEDURE — 85025 COMPLETE CBC W/AUTO DIFF WBC: CPT

## 2020-10-14 PROCEDURE — 3008F PR BODY MASS INDEX (BMI) DOCUMENTED: ICD-10-PCS | Mod: CPTII,S$GLB,, | Performed by: NURSE PRACTITIONER

## 2020-10-14 PROCEDURE — 99999 PR PBB SHADOW E&M-EST. PATIENT-LVL III: ICD-10-PCS | Mod: PBBFAC,,, | Performed by: NURSE PRACTITIONER

## 2020-10-14 PROCEDURE — 3008F BODY MASS INDEX DOCD: CPT | Mod: CPTII,S$GLB,, | Performed by: NURSE PRACTITIONER

## 2020-10-14 PROCEDURE — 80053 COMPREHEN METABOLIC PANEL: CPT

## 2020-10-14 PROCEDURE — 99396 PREV VISIT EST AGE 40-64: CPT | Mod: 25,S$GLB,, | Performed by: NURSE PRACTITIONER

## 2020-10-14 PROCEDURE — 80061 LIPID PANEL: CPT

## 2020-10-14 PROCEDURE — 90732 PNEUMOCOCCAL POLYSACCHARIDE VACCINE 23-VALENT =>2YO SQ IM: ICD-10-PCS | Mod: S$GLB,,, | Performed by: NURSE PRACTITIONER

## 2020-10-14 PROCEDURE — 90471 PNEUMOCOCCAL POLYSACCHARIDE VACCINE 23-VALENT =>2YO SQ IM: ICD-10-PCS | Mod: S$GLB,,, | Performed by: NURSE PRACTITIONER

## 2020-10-14 PROCEDURE — 99999 PR PBB SHADOW E&M-EST. PATIENT-LVL III: CPT | Mod: PBBFAC,,, | Performed by: NURSE PRACTITIONER

## 2020-10-14 PROCEDURE — 36415 COLL VENOUS BLD VENIPUNCTURE: CPT | Mod: S$GLB,,, | Performed by: FAMILY MEDICINE

## 2020-10-14 PROCEDURE — 90732 PPSV23 VACC 2 YRS+ SUBQ/IM: CPT | Mod: S$GLB,,, | Performed by: NURSE PRACTITIONER

## 2020-10-14 PROCEDURE — 36415 PR COLLECTION VENOUS BLOOD,VENIPUNCTURE: ICD-10-PCS | Mod: S$GLB,,, | Performed by: FAMILY MEDICINE

## 2020-10-14 NOTE — PROGRESS NOTES
Subjective:       Patient ID: Nikky Mo is a 60 y.o. female.    Chief Complaint: Wellness    Here for wellness exam and blood work.    Review of Systems   Constitutional: Negative.  Negative for appetite change, fatigue and fever.   HENT: Negative.  Negative for congestion, ear pain and sore throat.    Eyes: Negative.  Negative for visual disturbance.   Respiratory: Negative.  Negative for cough, shortness of breath and wheezing.    Cardiovascular: Negative.    Gastrointestinal: Negative.  Negative for abdominal pain, diarrhea, nausea and vomiting.        BM's daily   Endocrine: Negative.    Genitourinary: Negative.  Negative for difficulty urinating and urgency. Menstrual problem: hysterectomy.   Musculoskeletal: Negative.  Negative for arthralgias and myalgias.   Skin: Negative.  Negative for color change.   Allergic/Immunologic: Negative.    Neurological: Negative.  Negative for dizziness and headaches.   Hematological: Negative.    Psychiatric/Behavioral: Negative.  Negative for sleep disturbance.   All other systems reviewed and are negative.      Objective:      Physical Exam  Vitals signs and nursing note reviewed.   Constitutional:       Appearance: Normal appearance. She is well-developed.   HENT:      Head: Normocephalic and atraumatic.      Right Ear: Tympanic membrane and external ear normal.      Left Ear: Tympanic membrane and external ear normal.      Nose: Nose normal.      Mouth/Throat:      Mouth: Mucous membranes are moist.   Eyes:      Conjunctiva/sclera: Conjunctivae normal.      Pupils: Pupils are equal, round, and reactive to light.   Neck:      Musculoskeletal: Normal range of motion and neck supple.      Thyroid: No thyromegaly.   Cardiovascular:      Rate and Rhythm: Normal rate and regular rhythm.      Pulses: Normal pulses.      Heart sounds: Normal heart sounds. No murmur.   Pulmonary:      Effort: Pulmonary effort is normal. No respiratory distress.      Breath sounds: Normal breath  sounds.   Abdominal:      Palpations: Abdomen is soft.   Musculoskeletal: Normal range of motion.   Lymphadenopathy:      Cervical: No cervical adenopathy.   Skin:     General: Skin is warm and dry.      Findings: No rash.   Neurological:      Mental Status: She is alert and oriented to person, place, and time.      Deep Tendon Reflexes: Reflexes are normal and symmetric.   Psychiatric:         Mood and Affect: Mood normal.         Assessment:       1. Routine check-up        Plan:   Nikky was seen today for wellness.    Diagnoses and all orders for this visit:    Routine check-up  -     HIV 1/2 Ag/Ab (4th Gen)  -     CBC auto differential  -     Comprehensive Metabolic Panel  -     Lipid Panel  -     POCT urinalysis, dipstick or tablet reag  -     Pneumococcal Polysaccharide Vaccine (23 Valent) (SQ/IM)    RTC PRN - 1 year for recheck

## 2020-10-15 LAB — HIV 1+2 AB+HIV1 P24 AG SERPL QL IA: NEGATIVE

## 2020-10-21 ENCOUNTER — TELEPHONE (OUTPATIENT)
Dept: FAMILY MEDICINE | Facility: CLINIC | Age: 60
End: 2020-10-21

## 2020-10-21 NOTE — TELEPHONE ENCOUNTER
Lm for patient to call me back, elfego ordered a1c to be drawn. Blood sugar high in last weeks labs.

## 2020-12-02 ENCOUNTER — PATIENT MESSAGE (OUTPATIENT)
Dept: ADMINISTRATIVE | Facility: HOSPITAL | Age: 60
End: 2020-12-02

## 2021-01-04 ENCOUNTER — PATIENT MESSAGE (OUTPATIENT)
Dept: ADMINISTRATIVE | Facility: HOSPITAL | Age: 61
End: 2021-01-04

## 2021-04-05 ENCOUNTER — PATIENT MESSAGE (OUTPATIENT)
Dept: ADMINISTRATIVE | Facility: HOSPITAL | Age: 61
End: 2021-04-05

## 2021-07-07 ENCOUNTER — PATIENT MESSAGE (OUTPATIENT)
Dept: ADMINISTRATIVE | Facility: HOSPITAL | Age: 61
End: 2021-07-07

## 2021-11-17 DIAGNOSIS — Z12.31 OTHER SCREENING MAMMOGRAM: ICD-10-CM

## 2021-12-13 DIAGNOSIS — Z12.11 COLON CANCER SCREENING: ICD-10-CM

## 2022-02-10 ENCOUNTER — PATIENT MESSAGE (OUTPATIENT)
Dept: ADMINISTRATIVE | Facility: HOSPITAL | Age: 62
End: 2022-02-10
Payer: COMMERCIAL

## 2022-04-04 ENCOUNTER — PATIENT MESSAGE (OUTPATIENT)
Dept: ADMINISTRATIVE | Facility: HOSPITAL | Age: 62
End: 2022-04-04
Payer: COMMERCIAL

## 2022-07-11 ENCOUNTER — PATIENT MESSAGE (OUTPATIENT)
Dept: ADMINISTRATIVE | Facility: HOSPITAL | Age: 62
End: 2022-07-11
Payer: COMMERCIAL

## 2022-09-15 ENCOUNTER — TELEPHONE (OUTPATIENT)
Dept: FAMILY MEDICINE | Facility: CLINIC | Age: 62
End: 2022-09-15
Payer: COMMERCIAL

## 2022-09-15 NOTE — TELEPHONE ENCOUNTER
----- Message from Tawana Neal sent at 9/15/2022 10:42 AM CDT -----  Contact: self  Nikky Mo  MRN: [id number]  : 1960  PCP: Ambreen Marcano Foret  Home Phone      [phone]  Work Phone      Not on file.  Mobile          [phone]      MESSAGE:   Patient is interested in getting back on patch to quit smoking. States Marcano has treated her and for issue in the past. Wanting to know if she needs an appt or if she can get it called in.      Gerald    [phone]

## 2023-08-16 ENCOUNTER — OFFICE VISIT (OUTPATIENT)
Dept: FAMILY MEDICINE | Facility: CLINIC | Age: 63
End: 2023-08-16
Payer: MEDICAID

## 2023-08-16 VITALS
RESPIRATION RATE: 16 BRPM | HEIGHT: 61 IN | OXYGEN SATURATION: 96 % | SYSTOLIC BLOOD PRESSURE: 138 MMHG | WEIGHT: 144.5 LBS | BODY MASS INDEX: 27.28 KG/M2 | DIASTOLIC BLOOD PRESSURE: 74 MMHG | HEART RATE: 82 BPM

## 2023-08-16 DIAGNOSIS — Z00.00 ROUTINE CHECK-UP: Primary | ICD-10-CM

## 2023-08-16 DIAGNOSIS — Z12.31 BREAST CANCER SCREENING BY MAMMOGRAM: ICD-10-CM

## 2023-08-16 DIAGNOSIS — Z12.11 COLON CANCER SCREENING: ICD-10-CM

## 2023-08-16 DIAGNOSIS — F17.200 TOBACCO DEPENDENCE: ICD-10-CM

## 2023-08-16 DIAGNOSIS — F17.200 NEEDS SMOKING CESSATION EDUCATION: ICD-10-CM

## 2023-08-16 PROCEDURE — 3075F SYST BP GE 130 - 139MM HG: CPT | Mod: CPTII,,, | Performed by: NURSE PRACTITIONER

## 2023-08-16 PROCEDURE — 3078F DIAST BP <80 MM HG: CPT | Mod: CPTII,,, | Performed by: NURSE PRACTITIONER

## 2023-08-16 PROCEDURE — 3078F PR MOST RECENT DIASTOLIC BLOOD PRESSURE < 80 MM HG: ICD-10-PCS | Mod: CPTII,,, | Performed by: NURSE PRACTITIONER

## 2023-08-16 PROCEDURE — 99999 PR PBB SHADOW E&M-EST. PATIENT-LVL IV: ICD-10-PCS | Mod: PBBFAC,,, | Performed by: NURSE PRACTITIONER

## 2023-08-16 PROCEDURE — 99999 PR PBB SHADOW E&M-EST. PATIENT-LVL IV: CPT | Mod: PBBFAC,,, | Performed by: NURSE PRACTITIONER

## 2023-08-16 PROCEDURE — 1159F PR MEDICATION LIST DOCUMENTED IN MEDICAL RECORD: ICD-10-PCS | Mod: CPTII,,, | Performed by: NURSE PRACTITIONER

## 2023-08-16 PROCEDURE — 3008F BODY MASS INDEX DOCD: CPT | Mod: CPTII,,, | Performed by: NURSE PRACTITIONER

## 2023-08-16 PROCEDURE — 99214 PR OFFICE/OUTPT VISIT, EST, LEVL IV, 30-39 MIN: ICD-10-PCS | Mod: S$PBB,,, | Performed by: NURSE PRACTITIONER

## 2023-08-16 PROCEDURE — 1160F PR REVIEW ALL MEDS BY PRESCRIBER/CLIN PHARMACIST DOCUMENTED: ICD-10-PCS | Mod: CPTII,,, | Performed by: NURSE PRACTITIONER

## 2023-08-16 PROCEDURE — 99214 OFFICE O/P EST MOD 30 MIN: CPT | Mod: PBBFAC | Performed by: NURSE PRACTITIONER

## 2023-08-16 PROCEDURE — 3008F PR BODY MASS INDEX (BMI) DOCUMENTED: ICD-10-PCS | Mod: CPTII,,, | Performed by: NURSE PRACTITIONER

## 2023-08-16 PROCEDURE — 1159F MED LIST DOCD IN RCRD: CPT | Mod: CPTII,,, | Performed by: NURSE PRACTITIONER

## 2023-08-16 PROCEDURE — 1160F RVW MEDS BY RX/DR IN RCRD: CPT | Mod: CPTII,,, | Performed by: NURSE PRACTITIONER

## 2023-08-16 PROCEDURE — 99214 OFFICE O/P EST MOD 30 MIN: CPT | Mod: S$PBB,,, | Performed by: NURSE PRACTITIONER

## 2023-08-16 PROCEDURE — 3075F PR MOST RECENT SYSTOLIC BLOOD PRESS GE 130-139MM HG: ICD-10-PCS | Mod: CPTII,,, | Performed by: NURSE PRACTITIONER

## 2023-08-16 RX ORDER — IBUPROFEN 200 MG
1 TABLET ORAL DAILY
Qty: 30 PATCH | Refills: 2 | Status: SHIPPED | OUTPATIENT
Start: 2023-08-16

## 2023-08-16 NOTE — PROGRESS NOTES
Subjective:       Patient ID: Nikky Mo is a 62 y.o. female.    Chief Complaint: Annual Exam (Pt here for annual exam. )    Here for annual check up and lab work.      Review of Systems   Constitutional:  Positive for fatigue ( has been ill). Negative for appetite change and fever.   HENT: Negative.  Negative for congestion, ear pain and sore throat.    Eyes: Negative.  Negative for visual disturbance.   Respiratory: Negative.  Negative for cough, shortness of breath and wheezing.    Cardiovascular: Negative.    Gastrointestinal: Negative.  Negative for abdominal pain, diarrhea, nausea and vomiting.        BM's daily   Endocrine: Negative.         Hot flashes   Genitourinary: Negative.  Negative for difficulty urinating and urgency.   Musculoskeletal: Negative.  Negative for arthralgias and myalgias.   Skin: Negative.  Negative for color change.   Allergic/Immunologic: Negative.    Neurological: Negative.  Negative for dizziness and headaches.   Hematological: Negative.    Psychiatric/Behavioral: Negative.  Negative for sleep disturbance.    All other systems reviewed and are negative.      Objective:      Physical Exam  Vitals and nursing note reviewed.   Constitutional:       General: She is not in acute distress.     Appearance: Normal appearance. She is well-developed.   HENT:      Head: Normocephalic and atraumatic.   Eyes:      Pupils: Pupils are equal, round, and reactive to light.   Cardiovascular:      Rate and Rhythm: Normal rate and regular rhythm.      Pulses: Normal pulses.      Heart sounds: Normal heart sounds. No murmur heard.  Pulmonary:      Effort: Pulmonary effort is normal. No respiratory distress.      Breath sounds: Normal breath sounds.   Abdominal:      Tenderness: There is no right CVA tenderness or left CVA tenderness.   Musculoskeletal:         General: Normal range of motion.      Cervical back: Normal range of motion and neck supple.   Skin:     General: Skin is warm and dry.    Neurological:      Mental Status: She is alert and oriented to person, place, and time.   Psychiatric:         Mood and Affect: Mood normal.         Assessment:       1. Routine check-up    2. Breast cancer screening by mammogram    3. Colon cancer screening    4. Tobacco dependence        Plan:     1. Routine check-up  -     CBC Auto Differential; Future; Expected date: 08/16/2023  -     Comprehensive Metabolic Panel; Future; Expected date: 08/16/2023  -     Hemoglobin A1C; Future; Expected date: 08/16/2023  -     Lipid Panel; Future; Expected date: 08/16/2023  -     TSH; Future; Expected date: 08/16/2023    2. Breast cancer screening by mammogram  -     Mammo Digital Screening Bilat w/ Can; Future; Expected date: 08/16/2023    3. Colon cancer screening  -     Fecal Immunochemical Test (iFOBT); Future; Expected date: 08/16/2023    4. Tobacco dependence  -     nicotine (NICODERM CQ) 21 mg/24 hr; Place 1 patch onto the skin once daily.  Dispense: 30 patch; Refill: 2     RTC PRN

## 2023-08-17 ENCOUNTER — HOSPITAL ENCOUNTER (OUTPATIENT)
Dept: RADIOLOGY | Facility: HOSPITAL | Age: 63
Discharge: HOME | End: 2023-08-17
Attending: NURSE PRACTITIONER
Payer: MEDICAID

## 2023-08-17 ENCOUNTER — CLINICAL SUPPORT (OUTPATIENT)
Dept: FAMILY MEDICINE | Facility: CLINIC | Age: 63
End: 2023-08-17
Payer: MEDICAID

## 2023-08-17 ENCOUNTER — LAB VISIT (OUTPATIENT)
Dept: LAB | Facility: HOSPITAL | Age: 63
End: 2023-08-17
Payer: MEDICAID

## 2023-08-17 DIAGNOSIS — Z00.00 ROUTINE CHECK-UP: ICD-10-CM

## 2023-08-17 DIAGNOSIS — Z12.31 BREAST CANCER SCREENING BY MAMMOGRAM: ICD-10-CM

## 2023-08-17 DIAGNOSIS — Z12.11 COLON CANCER SCREENING: ICD-10-CM

## 2023-08-17 LAB
ALBUMIN SERPL BCP-MCNC: 3.8 G/DL (ref 3.5–5.2)
ALP SERPL-CCNC: 98 U/L (ref 55–135)
ALT SERPL W/O P-5'-P-CCNC: 25 U/L (ref 10–44)
ANION GAP SERPL CALC-SCNC: 10 MMOL/L (ref 8–16)
AST SERPL-CCNC: 23 U/L (ref 10–40)
BASOPHILS # BLD AUTO: 0.03 K/UL (ref 0–0.2)
BASOPHILS NFR BLD: 0.4 % (ref 0–1.9)
BILIRUB SERPL-MCNC: 0.8 MG/DL (ref 0.1–1)
BUN SERPL-MCNC: 15 MG/DL (ref 8–23)
CALCIUM SERPL-MCNC: 9.7 MG/DL (ref 8.7–10.5)
CHLORIDE SERPL-SCNC: 104 MMOL/L (ref 95–110)
CHOLEST SERPL-MCNC: 229 MG/DL (ref 120–199)
CHOLEST/HDLC SERPL: 5.7 {RATIO} (ref 2–5)
CO2 SERPL-SCNC: 26 MMOL/L (ref 23–29)
CREAT SERPL-MCNC: 0.8 MG/DL (ref 0.5–1.4)
DIFFERENTIAL METHOD: NORMAL
EOSINOPHIL # BLD AUTO: 0.3 K/UL (ref 0–0.5)
EOSINOPHIL NFR BLD: 3.5 % (ref 0–8)
ERYTHROCYTE [DISTWIDTH] IN BLOOD BY AUTOMATED COUNT: 13.2 % (ref 11.5–14.5)
EST. GFR  (NO RACE VARIABLE): >60 ML/MIN/1.73 M^2
ESTIMATED AVG GLUCOSE: 126 MG/DL (ref 68–131)
GLUCOSE SERPL-MCNC: 110 MG/DL (ref 70–110)
HBA1C MFR BLD: 6 % (ref 4–5.6)
HCT VFR BLD AUTO: 46.2 % (ref 37–48.5)
HDLC SERPL-MCNC: 40 MG/DL (ref 40–75)
HDLC SERPL: 17.5 % (ref 20–50)
HGB BLD-MCNC: 15.1 G/DL (ref 12–16)
IMM GRANULOCYTES # BLD AUTO: 0.02 K/UL (ref 0–0.04)
IMM GRANULOCYTES NFR BLD AUTO: 0.3 % (ref 0–0.5)
LDLC SERPL CALC-MCNC: 160 MG/DL (ref 63–159)
LYMPHOCYTES # BLD AUTO: 2.5 K/UL (ref 1–4.8)
LYMPHOCYTES NFR BLD: 33.6 % (ref 18–48)
MCH RBC QN AUTO: 29.8 PG (ref 27–31)
MCHC RBC AUTO-ENTMCNC: 32.7 G/DL (ref 32–36)
MCV RBC AUTO: 91 FL (ref 82–98)
MONOCYTES # BLD AUTO: 0.6 K/UL (ref 0.3–1)
MONOCYTES NFR BLD: 7.8 % (ref 4–15)
NEUTROPHILS # BLD AUTO: 4.1 K/UL (ref 1.8–7.7)
NEUTROPHILS NFR BLD: 54.4 % (ref 38–73)
NONHDLC SERPL-MCNC: 189 MG/DL
NRBC BLD-RTO: 0 /100 WBC
PLATELET # BLD AUTO: 281 K/UL (ref 150–450)
PMV BLD AUTO: 10.1 FL (ref 9.2–12.9)
POTASSIUM SERPL-SCNC: 4.6 MMOL/L (ref 3.5–5.1)
PROT SERPL-MCNC: 7.3 G/DL (ref 6–8.4)
RBC # BLD AUTO: 5.06 M/UL (ref 4–5.4)
SODIUM SERPL-SCNC: 140 MMOL/L (ref 136–145)
TRIGL SERPL-MCNC: 145 MG/DL (ref 30–150)
TSH SERPL DL<=0.005 MIU/L-ACNC: 1.51 UIU/ML (ref 0.4–4)
WBC # BLD AUTO: 7.46 K/UL (ref 3.9–12.7)

## 2023-08-17 PROCEDURE — 77067 SCR MAMMO BI INCL CAD: CPT | Mod: TC

## 2023-08-17 PROCEDURE — 77063 MAMMO DIGITAL SCREENING BILAT WITH TOMO: ICD-10-PCS | Mod: 26,,, | Performed by: RADIOLOGY

## 2023-08-17 PROCEDURE — 77067 MAMMO DIGITAL SCREENING BILAT WITH TOMO: ICD-10-PCS | Mod: 26,,, | Performed by: RADIOLOGY

## 2023-08-17 PROCEDURE — 80061 LIPID PANEL: CPT | Performed by: NURSE PRACTITIONER

## 2023-08-17 PROCEDURE — 77067 SCR MAMMO BI INCL CAD: CPT | Mod: 26,,, | Performed by: RADIOLOGY

## 2023-08-17 PROCEDURE — 99999PBSHW PR PBB SHADOW TECHNICAL ONLY FILED TO HB: Mod: PBBFAC,,,

## 2023-08-17 PROCEDURE — 83036 HEMOGLOBIN GLYCOSYLATED A1C: CPT | Performed by: NURSE PRACTITIONER

## 2023-08-17 PROCEDURE — 82274 ASSAY TEST FOR BLOOD FECAL: CPT | Performed by: NURSE PRACTITIONER

## 2023-08-17 PROCEDURE — 80053 COMPREHEN METABOLIC PANEL: CPT | Performed by: NURSE PRACTITIONER

## 2023-08-17 PROCEDURE — 36415 COLL VENOUS BLD VENIPUNCTURE: CPT | Mod: PBBFAC

## 2023-08-17 PROCEDURE — 84443 ASSAY THYROID STIM HORMONE: CPT | Performed by: NURSE PRACTITIONER

## 2023-08-17 PROCEDURE — 99999PBSHW PR PBB SHADOW TECHNICAL ONLY FILED TO HB: ICD-10-PCS | Mod: PBBFAC,,,

## 2023-08-17 PROCEDURE — 77063 BREAST TOMOSYNTHESIS BI: CPT | Mod: 26,,, | Performed by: RADIOLOGY

## 2023-08-17 PROCEDURE — 85025 COMPLETE CBC W/AUTO DIFF WBC: CPT | Performed by: NURSE PRACTITIONER

## 2023-08-21 ENCOUNTER — TELEPHONE (OUTPATIENT)
Dept: FAMILY MEDICINE | Facility: CLINIC | Age: 63
End: 2023-08-21
Payer: MEDICAID

## 2023-08-21 DIAGNOSIS — R73.03 PREDIABETES: Primary | ICD-10-CM

## 2023-08-21 DIAGNOSIS — E78.2 MIXED HYPERLIPIDEMIA: ICD-10-CM

## 2023-08-21 RX ORDER — METFORMIN HYDROCHLORIDE 500 MG/1
500 TABLET, EXTENDED RELEASE ORAL
Qty: 30 TABLET | Refills: 3 | Status: SHIPPED | OUTPATIENT
Start: 2023-08-21 | End: 2024-08-20

## 2023-08-21 RX ORDER — PRAVASTATIN SODIUM 10 MG/1
10 TABLET ORAL DAILY
Qty: 30 TABLET | Refills: 3 | Status: SHIPPED | OUTPATIENT
Start: 2023-08-21 | End: 2024-08-20

## 2023-08-21 NOTE — PROGRESS NOTES
Abnormal lab - A1c is 6.0. I would like to start her on metformin. Also the cholesterol is elevated with the bad cholesterol being high. I would also like to start her on cholesterol medicine. Meds sent to [facility]

## 2023-08-23 LAB — HEMOCCULT STL QL IA: NEGATIVE

## 2024-08-29 DIAGNOSIS — Z12.31 OTHER SCREENING MAMMOGRAM: ICD-10-CM

## 2024-08-29 DIAGNOSIS — R73.03 PREDIABETES: ICD-10-CM

## 2024-09-18 ENCOUNTER — HOSPITAL ENCOUNTER (EMERGENCY)
Facility: HOSPITAL | Age: 64
Discharge: HOME | End: 2024-09-18
Attending: SURGERY

## 2024-09-18 VITALS
HEART RATE: 67 BPM | WEIGHT: 139.88 LBS | SYSTOLIC BLOOD PRESSURE: 178 MMHG | BODY MASS INDEX: 27.46 KG/M2 | RESPIRATION RATE: 18 BRPM | TEMPERATURE: 98 F | DIASTOLIC BLOOD PRESSURE: 72 MMHG | OXYGEN SATURATION: 99 % | HEIGHT: 60 IN

## 2024-09-18 DIAGNOSIS — R07.89 ATYPICAL CHEST PAIN: Primary | ICD-10-CM

## 2024-09-18 LAB
ALBUMIN SERPL BCP-MCNC: 3.8 G/DL (ref 3.5–5.2)
ALP SERPL-CCNC: 114 U/L (ref 55–135)
ALT SERPL W/O P-5'-P-CCNC: 22 U/L (ref 10–44)
ANION GAP SERPL CALC-SCNC: 12 MMOL/L (ref 8–16)
AST SERPL-CCNC: 19 U/L (ref 10–40)
BASOPHILS # BLD AUTO: 0.04 K/UL (ref 0–0.2)
BASOPHILS NFR BLD: 0.5 % (ref 0–1.9)
BILIRUB SERPL-MCNC: 0.4 MG/DL (ref 0.1–1)
BNP SERPL-MCNC: <10 PG/ML (ref 0–99)
BUN SERPL-MCNC: 16 MG/DL (ref 8–23)
CALCIUM SERPL-MCNC: 9.8 MG/DL (ref 8.7–10.5)
CHLORIDE SERPL-SCNC: 104 MMOL/L (ref 95–110)
CO2 SERPL-SCNC: 24 MMOL/L (ref 23–29)
CREAT SERPL-MCNC: 0.9 MG/DL (ref 0.5–1.4)
D DIMER PPP IA.FEU-MCNC: 0.36 MG/L FEU
DIFFERENTIAL METHOD BLD: NORMAL
EOSINOPHIL # BLD AUTO: 0.2 K/UL (ref 0–0.5)
EOSINOPHIL NFR BLD: 2.6 % (ref 0–8)
ERYTHROCYTE [DISTWIDTH] IN BLOOD BY AUTOMATED COUNT: 13.3 % (ref 11.5–14.5)
EST. GFR  (NO RACE VARIABLE): >60 ML/MIN/1.73 M^2
GLUCOSE SERPL-MCNC: 103 MG/DL (ref 70–110)
HCT VFR BLD AUTO: 45.6 % (ref 37–48.5)
HGB BLD-MCNC: 15 G/DL (ref 12–16)
IMM GRANULOCYTES # BLD AUTO: 0.03 K/UL (ref 0–0.04)
IMM GRANULOCYTES NFR BLD AUTO: 0.4 % (ref 0–0.5)
LYMPHOCYTES # BLD AUTO: 3 K/UL (ref 1–4.8)
LYMPHOCYTES NFR BLD: 38.4 % (ref 18–48)
MCH RBC QN AUTO: 29.6 PG (ref 27–31)
MCHC RBC AUTO-ENTMCNC: 32.9 G/DL (ref 32–36)
MCV RBC AUTO: 90 FL (ref 82–98)
MONOCYTES # BLD AUTO: 0.6 K/UL (ref 0.3–1)
MONOCYTES NFR BLD: 8.2 % (ref 4–15)
NEUTROPHILS # BLD AUTO: 3.8 K/UL (ref 1.8–7.7)
NEUTROPHILS NFR BLD: 49.9 % (ref 38–73)
NRBC BLD-RTO: 0 /100 WBC
PLATELET # BLD AUTO: 279 K/UL (ref 150–450)
PMV BLD AUTO: 9.2 FL (ref 9.2–12.9)
POTASSIUM SERPL-SCNC: 4 MMOL/L (ref 3.5–5.1)
PROT SERPL-MCNC: 7.5 G/DL (ref 6–8.4)
RBC # BLD AUTO: 5.06 M/UL (ref 4–5.4)
SODIUM SERPL-SCNC: 140 MMOL/L (ref 136–145)
TROPONIN I SERPL DL<=0.01 NG/ML-MCNC: <0.006 NG/ML (ref 0–0.03)
TROPONIN I SERPL DL<=0.01 NG/ML-MCNC: <0.006 NG/ML (ref 0–0.03)
WBC # BLD AUTO: 7.7 K/UL (ref 3.9–12.7)

## 2024-09-18 PROCEDURE — 85379 FIBRIN DEGRADATION QUANT: CPT | Performed by: NURSE PRACTITIONER

## 2024-09-18 PROCEDURE — 93010 ELECTROCARDIOGRAM REPORT: CPT | Mod: ,,, | Performed by: INTERNAL MEDICINE

## 2024-09-18 PROCEDURE — 99900035 HC TECH TIME PER 15 MIN (STAT)

## 2024-09-18 PROCEDURE — 25000003 PHARM REV CODE 250: Performed by: NURSE PRACTITIONER

## 2024-09-18 PROCEDURE — 80053 COMPREHEN METABOLIC PANEL: CPT | Performed by: NURSE PRACTITIONER

## 2024-09-18 PROCEDURE — 99285 EMERGENCY DEPT VISIT HI MDM: CPT | Mod: 25

## 2024-09-18 PROCEDURE — 99900031 HC PATIENT EDUCATION (STAT)

## 2024-09-18 PROCEDURE — 83880 ASSAY OF NATRIURETIC PEPTIDE: CPT | Performed by: NURSE PRACTITIONER

## 2024-09-18 PROCEDURE — 84484 ASSAY OF TROPONIN QUANT: CPT | Mod: 91 | Performed by: NURSE PRACTITIONER

## 2024-09-18 PROCEDURE — 93005 ELECTROCARDIOGRAM TRACING: CPT

## 2024-09-18 PROCEDURE — 85025 COMPLETE CBC W/AUTO DIFF WBC: CPT | Performed by: NURSE PRACTITIONER

## 2024-09-18 RX ORDER — ASPIRIN 325 MG
325 TABLET ORAL
Status: COMPLETED | OUTPATIENT
Start: 2024-09-18 | End: 2024-09-18

## 2024-09-18 RX ORDER — LIDOCAINE HYDROCHLORIDE 20 MG/ML
15 SOLUTION OROPHARYNGEAL ONCE
Status: COMPLETED | OUTPATIENT
Start: 2024-09-18 | End: 2024-09-18

## 2024-09-18 RX ORDER — ALUMINUM HYDROXIDE, MAGNESIUM HYDROXIDE, AND SIMETHICONE 1200; 120; 1200 MG/30ML; MG/30ML; MG/30ML
30 SUSPENSION ORAL ONCE
Status: COMPLETED | OUTPATIENT
Start: 2024-09-18 | End: 2024-09-18

## 2024-09-18 RX ORDER — PANTOPRAZOLE SODIUM 40 MG/1
40 TABLET, DELAYED RELEASE ORAL DAILY
Qty: 30 TABLET | Refills: 0 | Status: SHIPPED | OUTPATIENT
Start: 2024-09-18 | End: 2024-10-18

## 2024-09-18 RX ADMIN — ALUMINUM HYDROXIDE, MAGNESIUM HYDROXIDE, AND DIMETHICONE 30 ML: 200; 20; 200 SUSPENSION ORAL at 09:09

## 2024-09-18 RX ADMIN — LIDOCAINE HYDROCHLORIDE 15 ML: 20 SOLUTION ORAL at 09:09

## 2024-09-18 RX ADMIN — ASPIRIN 325 MG ORAL TABLET 325 MG: 325 PILL ORAL at 07:09

## 2024-09-19 NOTE — ED PROVIDER NOTES
Encounter Date: 9/18/2024       History     Chief Complaint   Patient presents with    Chest Pain     Pt to ED with c/o chest pain that radiates into left ribs that began last night     Nikky Mo is a 63 y.o. female with PMH of renal calculi presenting to the ED for evaluation of chest pain.  Patient reports that she developed a sharp pain in the left side her chest wall yesterday. Pain is intermittent occurring every 15-20 min.  She currently denies pain but reports that when it occurs, it is a 7-8/10. Pain radiates into her L breast. Denies associated SOB. Denies hx of heart disease. She reports that she was told that she has COPD. She is a smoker.     The history is provided by the patient.     Review of patient's allergies indicates:  No Known Allergies  Past Medical History:   Diagnosis Date    Encounter for blood transfusion     Ganglion of knee     H/O: hysterectomy     H/O: hysterectomy     Renal calculi      Past Surgical History:   Procedure Laterality Date    HYSTERECTOMY       Family History   Problem Relation Name Age of Onset    Breast cancer Neg Hx      Colon cancer Neg Hx      Ovarian cancer Neg Hx       Social History     Tobacco Use    Smoking status: Every Day     Current packs/day: 1.00     Average packs/day: 1 pack/day for 46.2 years (46.2 ttl pk-yrs)     Types: Cigarettes     Start date: 7/5/1978    Smokeless tobacco: Never   Substance Use Topics    Alcohol use: Not Currently    Drug use: Never     Review of Systems   Constitutional:  Negative for chills.   HENT:  Negative for congestion and sore throat.    Eyes:  Negative for pain.   Respiratory:  Positive for cough. Negative for shortness of breath.    Cardiovascular:  Positive for chest pain. Negative for leg swelling.   Gastrointestinal:  Negative for abdominal pain, diarrhea, nausea and vomiting.   Genitourinary:  Negative for flank pain.   Musculoskeletal:  Negative for back pain.   Neurological:  Negative for headaches.        Physical Exam     Initial Vitals [09/18/24 1941]   BP Pulse Resp Temp SpO2   (!) 195/84 71 20 98.1 °F (36.7 °C) 98 %      MAP       --         Physical Exam    Nursing note and vitals reviewed.  Constitutional: She appears well-developed and well-nourished.   HENT:   Head: Normocephalic and atraumatic.   Eyes: Conjunctivae and EOM are normal. Pupils are equal, round, and reactive to light.   Neck: Neck supple.   Cardiovascular:  Normal rate, regular rhythm, normal heart sounds and intact distal pulses.           Pulmonary/Chest: Breath sounds normal.   Abdominal: Abdomen is soft. Bowel sounds are normal.   Musculoskeletal:         General: Normal range of motion.      Cervical back: Neck supple.     Neurological: She is alert and oriented to person, place, and time. She has normal strength.   Skin: Skin is warm and dry. Capillary refill takes less than 2 seconds.   Psychiatric: She has a normal mood and affect. Her behavior is normal. Judgment and thought content normal.         ED Course   Procedures  Labs Reviewed   CBC W/ AUTO DIFFERENTIAL       Result Value    WBC 7.70      RBC 5.06      Hemoglobin 15.0      Hematocrit 45.6      MCV 90      MCH 29.6      MCHC 32.9      RDW 13.3      Platelets 279      MPV 9.2      Immature Granulocytes 0.4      Gran # (ANC) 3.8      Immature Grans (Abs) 0.03      Lymph # 3.0      Mono # 0.6      Eos # 0.2      Baso # 0.04      nRBC 0      Gran % 49.9      Lymph % 38.4      Mono % 8.2      Eosinophil % 2.6      Basophil % 0.5      Differential Method Automated     COMPREHENSIVE METABOLIC PANEL    Sodium 140      Potassium 4.0      Chloride 104      CO2 24      Glucose 103      BUN 16      Creatinine 0.9      Calcium 9.8      Total Protein 7.5      Albumin 3.8      Total Bilirubin 0.4      Alkaline Phosphatase 114      AST 19      ALT 22      eGFR >60      Anion Gap 12     TROPONIN I    Troponin I <0.006     B-TYPE NATRIURETIC PEPTIDE    BNP <10     D DIMER, QUANTITATIVE     D-Dimer 0.36            Imaging Results              X-Ray Chest AP Portable (Final result)  Result time 09/18/24 20:50:26      Final result by Kee Reddy DO (09/18/24 20:50:26)                   Impression:      No acute abnormality.      Electronically signed by: Kee Reddy  Date:    09/18/2024  Time:    20:50               Narrative:    EXAMINATION:  XR CHEST AP PORTABLE    CLINICAL HISTORY:  Chest Pain;    TECHNIQUE:  Single frontal view of the chest was performed.    COMPARISON:  07/05/2017.    FINDINGS:  The lungs are well expanded and clear. No focal opacities are seen. The pleural spaces are clear. The cardiac silhouette is unremarkable.  There are calcifications of the aortic arch.  The visualized osseous structures are unremarkable.                                       Medications   aspirin tablet 325 mg (325 mg Oral Given 9/18/24 1959)   aluminum-magnesium hydroxide-simethicone 200-200-20 mg/5 mL suspension 30 mL (30 mLs Oral Given 9/18/24 2141)     And   LIDOcaine viscous HCl 2% oral solution 15 mL (15 mLs Oral Given 9/18/24 2141)     Medical Decision Making  Evaluation of a 62 yo female with a 2 day ho chest pain.   Presents with stable Vs.   Physical exam is grossly normal    Diff dx includes ACS, pleurisy, COPD, MSK pain, PE, pneumonia     Amount and/or Complexity of Data Reviewed  Labs: ordered. Decision-making details documented in ED Course.  Radiology: ordered. Decision-making details documented in ED Course.  ECG/medicine tests: ordered and independent interpretation performed.     Details: Normal sinus rhythm, rate 62.  Normal IA and QRS intervals.  No STEMI.  No significant change when compared to EKG of July 2017    Risk  OTC drugs.  Prescription drug management.  Risk Details: Stable for discharge home.  Patient presented with a two-day history of left-sided chest wall pain.  Negative cardiac workup in the ED including negative troponin x2.  Patient remained pain free in the ED.  She is established with Dr. Garcia, ISELA, instructed to follow up in the next 48 hours. Patient/caregiver voices understanding and feels comfortable with discharge plan.      The patient acknowledges that close follow up with medical provider is required. Instructed to follow up with PCP within 2 days. Patient was given specific return precautions. The patient agrees to comply with all instruction and directions given in the ER.                                        Clinical Impression:  Final diagnoses:  [R07.89] Atypical chest pain (Primary)                 Bibiana Sandhu NP  09/18/24 0801

## 2024-09-20 LAB
OHS QRS DURATION: 86 MS
OHS QTC CALCULATION: 418 MS

## 2025-06-05 ENCOUNTER — PATIENT OUTREACH (OUTPATIENT)
Dept: ADMINISTRATIVE | Facility: HOSPITAL | Age: 65
End: 2025-06-05

## 2025-07-03 ENCOUNTER — PATIENT OUTREACH (OUTPATIENT)
Dept: ADMINISTRATIVE | Facility: HOSPITAL | Age: 65
End: 2025-07-03

## 2025-07-03 NOTE — PROGRESS NOTES
Portal active: 11/09/2024  Chart reviewed, immunization record updated.  No new results noted on [facility] or [facility] web site.  Care Everywhere updated.   Smoking Cessation Program Eligibility: Eligible   Patient care coordination note  Upcoming PCP visit updated.  Next PCP visit (Scheduled PCP appointment 09/12/2025)  LOV with PCP 08/16/2023   Contacted patient to schedule a PCP appointment. Patient is on the Statin Therapy Gap Report. Patient has not been seen since 2023 and agreed to schedule a PCP appointment.